# Patient Record
Sex: MALE | Race: WHITE | NOT HISPANIC OR LATINO | Employment: OTHER | RURAL
[De-identification: names, ages, dates, MRNs, and addresses within clinical notes are randomized per-mention and may not be internally consistent; named-entity substitution may affect disease eponyms.]

---

## 2022-02-20 ENCOUNTER — HOSPITAL ENCOUNTER (EMERGENCY)
Facility: HOSPITAL | Age: 72
Discharge: HOME | End: 2022-02-20
Payer: COMMERCIAL

## 2022-02-20 VITALS
HEART RATE: 79 BPM | DIASTOLIC BLOOD PRESSURE: 73 MMHG | BODY MASS INDEX: 48.64 KG/M2 | RESPIRATION RATE: 22 BRPM | WEIGHT: 302.63 LBS | SYSTOLIC BLOOD PRESSURE: 152 MMHG | OXYGEN SATURATION: 96 % | HEIGHT: 66 IN | TEMPERATURE: 98 F

## 2022-02-20 DIAGNOSIS — R11.2 NAUSEA VOMITING AND DIARRHEA: Primary | ICD-10-CM

## 2022-02-20 DIAGNOSIS — R19.7 NAUSEA VOMITING AND DIARRHEA: Primary | ICD-10-CM

## 2022-02-20 LAB
ACETONE SERPL QL SCN: NEGATIVE
ALBUMIN SERPL BCP-MCNC: 3.7 G/DL (ref 3.5–5)
ALBUMIN/GLOB SERPL: 1.1 {RATIO}
ALP SERPL-CCNC: 97 U/L (ref 45–115)
ALT SERPL W P-5'-P-CCNC: 26 U/L (ref 16–61)
ANION GAP SERPL CALCULATED.3IONS-SCNC: 16 MMOL/L (ref 7–16)
AST SERPL W P-5'-P-CCNC: 23 U/L (ref 15–37)
BACTERIA #/AREA URNS HPF: ABNORMAL /HPF
BASOPHILS # BLD AUTO: 0.02 K/UL (ref 0–0.2)
BASOPHILS NFR BLD AUTO: 0.2 % (ref 0–1)
BILIRUB SERPL-MCNC: 0.8 MG/DL (ref 0–1.2)
BILIRUB UR QL STRIP: NEGATIVE
BUN SERPL-MCNC: 29 MG/DL (ref 7–18)
BUN/CREAT SERPL: 18 (ref 6–20)
CALCIUM SERPL-MCNC: 8.9 MG/DL (ref 8.5–10.1)
CHLORIDE SERPL-SCNC: 99 MMOL/L (ref 98–107)
CLARITY UR: CLEAR
CO2 SERPL-SCNC: 26 MMOL/L (ref 21–32)
COLOR UR: YELLOW
CREAT SERPL-MCNC: 1.61 MG/DL (ref 0.7–1.3)
DIFFERENTIAL METHOD BLD: ABNORMAL
EOSINOPHIL # BLD AUTO: 0.11 K/UL (ref 0–0.5)
EOSINOPHIL NFR BLD AUTO: 0.8 % (ref 1–4)
EOSINOPHIL NFR BLD MANUAL: 1 % (ref 1–4)
ERYTHROCYTE [DISTWIDTH] IN BLOOD BY AUTOMATED COUNT: 13.5 % (ref 11.5–14.5)
FLUAV AG UPPER RESP QL IA.RAPID: NEGATIVE
FLUBV AG UPPER RESP QL IA.RAPID: NEGATIVE
GLOBULIN SER-MCNC: 3.3 G/DL (ref 2–4)
GLUCOSE SERPL-MCNC: 353 MG/DL (ref 70–105)
GLUCOSE SERPL-MCNC: 407 MG/DL (ref 74–106)
GLUCOSE UR STRIP-MCNC: >=1000 MG/DL
HCT VFR BLD AUTO: 44.6 % (ref 40–54)
HGB BLD-MCNC: 14.8 G/DL (ref 13.5–18)
KETONES UR STRIP-SCNC: 15 MG/DL
LEUKOCYTE ESTERASE UR QL STRIP: NEGATIVE
LIPASE SERPL-CCNC: 83 U/L (ref 73–393)
LYMPHOCYTES # BLD AUTO: 0.4 K/UL (ref 1–4.8)
LYMPHOCYTES NFR BLD AUTO: 3 % (ref 27–41)
LYMPHOCYTES NFR BLD MANUAL: 5 % (ref 27–41)
MCH RBC QN AUTO: 30.1 PG (ref 27–31)
MCHC RBC AUTO-ENTMCNC: 33.2 G/DL (ref 32–36)
MCV RBC AUTO: 90.7 FL (ref 80–96)
MONOCYTES # BLD AUTO: 1.15 K/UL (ref 0–0.8)
MONOCYTES NFR BLD AUTO: 8.8 % (ref 2–6)
MONOCYTES NFR BLD MANUAL: 7 % (ref 2–6)
MPC BLD CALC-MCNC: 9.4 FL (ref 9.4–12.4)
MUCOUS THREADS #/AREA URNS HPF: ABNORMAL /HPF
NEUTROPHILS # BLD AUTO: 11.46 K/UL (ref 1.8–7.7)
NEUTROPHILS NFR BLD AUTO: 87.2 % (ref 53–65)
NEUTS BAND NFR BLD MANUAL: 7 % (ref 1–5)
NEUTS SEG NFR BLD MANUAL: 80 % (ref 50–62)
NITRITE UR QL STRIP: NEGATIVE
NRBC BLD MANUAL-RTO: ABNORMAL %
PH UR STRIP: 5 PH UNITS
PLATELET # BLD AUTO: 135 K/UL (ref 150–400)
PLATELET MORPHOLOGY: ABNORMAL
POTASSIUM SERPL-SCNC: 4.4 MMOL/L (ref 3.5–5.1)
PROT SERPL-MCNC: 7 G/DL (ref 6.4–8.2)
PROT UR QL STRIP: 30
RBC # BLD AUTO: 4.92 M/UL (ref 4.6–6.2)
RBC # UR STRIP: NEGATIVE /UL
RBC #/AREA URNS HPF: ABNORMAL /HPF
RBC MORPH BLD: NORMAL
REACTIVE LYMPHOCYTES: ABNORMAL
SARS-COV+SARS-COV-2 AG RESP QL IA.RAPID: NEGATIVE
SODIUM SERPL-SCNC: 137 MMOL/L (ref 136–145)
SP GR UR STRIP: 1.01
SQUAMOUS #/AREA URNS LPF: ABNORMAL /LPF
UROBILINOGEN UR STRIP-ACNC: 0.2 MG/DL
WBC # BLD AUTO: 13.14 K/UL (ref 4.5–11)
WBC #/AREA URNS HPF: ABNORMAL /HPF

## 2022-02-20 PROCEDURE — 81001 URINALYSIS AUTO W/SCOPE: CPT | Performed by: NURSE PRACTITIONER

## 2022-02-20 PROCEDURE — 99284 PR EMERGENCY DEPT VISIT,LEVEL IV: ICD-10-PCS | Mod: ,,, | Performed by: NURSE PRACTITIONER

## 2022-02-20 PROCEDURE — 96361 HYDRATE IV INFUSION ADD-ON: CPT

## 2022-02-20 PROCEDURE — 87428 SARSCOV & INF VIR A&B AG IA: CPT | Performed by: NURSE PRACTITIONER

## 2022-02-20 PROCEDURE — 96374 THER/PROPH/DIAG INJ IV PUSH: CPT

## 2022-02-20 PROCEDURE — 99284 EMERGENCY DEPT VISIT MOD MDM: CPT | Mod: ,,, | Performed by: NURSE PRACTITIONER

## 2022-02-20 PROCEDURE — 82962 GLUCOSE BLOOD TEST: CPT

## 2022-02-20 PROCEDURE — 83690 ASSAY OF LIPASE: CPT | Performed by: NURSE PRACTITIONER

## 2022-02-20 PROCEDURE — 82009 KETONE BODYS QUAL: CPT | Performed by: NURSE PRACTITIONER

## 2022-02-20 PROCEDURE — 36415 COLL VENOUS BLD VENIPUNCTURE: CPT | Performed by: NURSE PRACTITIONER

## 2022-02-20 PROCEDURE — 99285 EMERGENCY DEPT VISIT HI MDM: CPT | Mod: 25,CS

## 2022-02-20 PROCEDURE — 85025 COMPLETE CBC W/AUTO DIFF WBC: CPT | Performed by: NURSE PRACTITIONER

## 2022-02-20 PROCEDURE — 25000003 PHARM REV CODE 250: Performed by: NURSE PRACTITIONER

## 2022-02-20 PROCEDURE — 63600175 PHARM REV CODE 636 W HCPCS: Performed by: NURSE PRACTITIONER

## 2022-02-20 PROCEDURE — 80053 COMPREHEN METABOLIC PANEL: CPT | Performed by: NURSE PRACTITIONER

## 2022-02-20 RX ORDER — ATORVASTATIN CALCIUM 20 MG/1
20 TABLET, FILM COATED ORAL
COMMUNITY

## 2022-02-20 RX ORDER — ONDANSETRON 4 MG/1
4 TABLET, ORALLY DISINTEGRATING ORAL EVERY 8 HOURS PRN
Qty: 10 TABLET | Refills: 0 | Status: SHIPPED | OUTPATIENT
Start: 2022-02-20

## 2022-02-20 RX ORDER — VALSARTAN 160 MG/1
TABLET ORAL
COMMUNITY
Start: 2022-01-05

## 2022-02-20 RX ORDER — AMMONIUM LACTATE 12 G/100G
LOTION TOPICAL
COMMUNITY

## 2022-02-20 RX ORDER — ONDANSETRON 4 MG/1
4 TABLET, ORALLY DISINTEGRATING ORAL EVERY 8 HOURS PRN
Qty: 10 TABLET | Refills: 0 | Status: CANCELLED | OUTPATIENT
Start: 2022-02-20

## 2022-02-20 RX ORDER — AMIODARONE HYDROCHLORIDE 200 MG/1
100 TABLET ORAL DAILY
COMMUNITY
Start: 2022-01-10

## 2022-02-20 RX ORDER — APIXABAN 5 MG/1
5 TABLET, FILM COATED ORAL 2 TIMES DAILY
COMMUNITY
Start: 2022-02-02

## 2022-02-20 RX ORDER — ONDANSETRON 2 MG/ML
4 INJECTION INTRAMUSCULAR; INTRAVENOUS
Status: COMPLETED | OUTPATIENT
Start: 2022-02-20 | End: 2022-02-20

## 2022-02-20 RX ORDER — DULAGLUTIDE 0.75 MG/.5ML
INJECTION, SOLUTION SUBCUTANEOUS
COMMUNITY
Start: 2022-01-04

## 2022-02-20 RX ORDER — CLOBETASOL PROPIONATE 0.5 MG/G
CREAM TOPICAL
COMMUNITY
Start: 2022-01-03

## 2022-02-20 RX ADMIN — ONDANSETRON 4 MG: 2 INJECTION INTRAMUSCULAR; INTRAVENOUS at 06:02

## 2022-02-20 RX ADMIN — SODIUM CHLORIDE 1000 ML: 9 INJECTION, SOLUTION INTRAVENOUS at 06:02

## 2022-02-20 NOTE — DISCHARGE INSTRUCTIONS
Take ondansetron as needed for nausea. Drink plenty of liquids. Maintain a clear liquid diet for the next 8-12 hours. Increased to bland diet if not vomiting. Stay on bland diet for 2-3 days. Monitor you blood sugar closely since it is elevated today. Return to the ED if you start to have fever, have worsening or change in pain, intractable vomiting, or any other worsening. Follow-up with your PCP in 2-3 days for recheck.

## 2022-02-20 NOTE — ED PROVIDER NOTES
Encounter Date: 2022       History     Chief Complaint   Patient presents with    Abdominal Pain     Right side     Presents with nausea, vomiting, diarrhea, and right lower abdominal pain.  Began with right lower abdominal pain upon awakening yesterday, then progressing with nausea, vomiting, and diarrhea.  Afebrile.  Denies dysuria, hematuria, or change in urinary frequency.  Has not tolerated p.o. intake since yesterday.  Last normal bowel movement was yesterday.  Denies blood or mucus in the stool.  No history of abdominal surgery.        Review of patient's allergies indicates:   Allergen Reactions    Bactrim [sulfamethoxazole-trimethoprim] Nausea And Vomiting    Sulfa (sulfonamide antibiotics) Nausea And Vomiting    Adhesive Rash    Latex, natural rubber Rash     Past Medical History:   Diagnosis Date    Cataract     Cyst, kidney, acquired     Diabetes mellitus     Hypertension     Kidney stones      Past Surgical History:   Procedure Laterality Date    CARPAL TUNNEL RELEASE Left     ELBOW FUSION Right     KIDNEY STONE SURGERY      SHOULDER SURGERY Right     spur scraped    TOTAL KNEE ARTHROPLASTY Bilateral      Family History   Problem Relation Age of Onset    Diabetes Mother     Hypertension Mother     Cancer Father      Social History     Tobacco Use    Smoking status: Former Smoker     Packs/day: 2.00     Years: 27.00     Pack years: 54.00     Types: Cigarettes     Start date: 1962     Quit date: 12/3/1989     Years since quittin.2    Smokeless tobacco: Never Used    Tobacco comment: quit 15-20 years   Substance Use Topics    Alcohol use: No    Drug use: No     Review of Systems   Constitutional: Negative for fever.   HENT: Negative for trouble swallowing.    Eyes: Negative for visual disturbance.   Respiratory: Negative for shortness of breath.    Cardiovascular: Negative for chest pain, palpitations and leg swelling.   Gastrointestinal: Positive for abdominal pain,  diarrhea, nausea and vomiting.   Genitourinary: Negative for decreased urine volume, dysuria, frequency and hematuria.   Skin: Negative for color change.   Neurological: Negative for dizziness, numbness and headaches.       Physical Exam     Initial Vitals [02/20/22 0531]   BP Pulse Resp Temp SpO2   (!) 146/76 87 18 98.9 °F (37.2 °C) 96 %      MAP       --         Physical Exam    Nursing note and vitals reviewed.  Constitutional: No distress.   HENT:   Head: Normocephalic and atraumatic.   Eyes: EOM are normal. Pupils are equal, round, and reactive to light.   Neck: Neck supple.   Cardiovascular: Normal rate, regular rhythm and normal heart sounds.   Pulmonary/Chest: Breath sounds normal. No respiratory distress.   Abdominal: Abdomen is soft. Bowel sounds are normal. He exhibits no distension. There is no abdominal tenderness (CVAT absent).   Musculoskeletal:         General: No edema.      Cervical back: Neck supple.     Neurological: He is alert. GCS score is 15. GCS eye subscore is 4. GCS verbal subscore is 5. GCS motor subscore is 6.   Skin: Skin is warm and dry. Capillary refill takes less than 2 seconds.         Medical Screening Exam   See Full Note    ED Course   Procedures  Labs Reviewed   COMPREHENSIVE METABOLIC PANEL - Abnormal; Notable for the following components:       Result Value    Glucose 407 (*)     BUN 29 (*)     Creatinine 1.61 (*)     eGFR 45 (*)     All other components within normal limits   URINALYSIS, REFLEX TO URINE CULTURE - Abnormal; Notable for the following components:    Protein, UA 30  (*)     Glucose, UA >=1000 (*)     Ketones, UA 15  (*)     All other components within normal limits   CBC WITH DIFFERENTIAL - Abnormal; Notable for the following components:    WBC 13.14 (*)     Platelet Count 135 (*)     Neutrophils % 87.2 (*)     Lymphocytes % 3.0 (*)     Neutrophils, Abs 11.46 (*)     Lymphocytes, Absolute 0.40 (*)     Monocytes % 8.8 (*)     Eosinophils % 0.8 (*)     Monocytes,  Absolute 1.15 (*)     All other components within normal limits   MANUAL DIFFERENTIAL - Abnormal; Notable for the following components:    Segmented Neutrophils, Man % 80 (*)     Bands, Man % 7 (*)     Lymphocytes, Man % 5 (*)     Monocytes, Man % 7 (*)     Platelet Morphology Decreased (*)     All other components within normal limits   URINALYSIS, MICROSCOPIC - Abnormal; Notable for the following components:    Bacteria, UA Occasional (*)     Squamous Epithelial Cells, UA Few (*)     Mucus, UA Few (*)     All other components within normal limits   LIPASE - Normal   SARS-COV2 (COVID) W/ FLU ANTIGEN - Normal    Narrative:     Negative SARS-CoV results should not be used as the sole basis for treatment or patient management decisions; negative results should be considered in the context of a patient's recent exposures, history and the presene of clinical signs and symptoms consistent with COVID-19.  Negative results should be treated as presumptive and confirmed by molecular assay, if necessary for patient management.   CBC W/ AUTO DIFFERENTIAL    Narrative:     The following orders were created for panel order CBC auto differential.  Procedure                               Abnormality         Status                     ---------                               -----------         ------                     CBC with Differential[[id number]]        Abnormal            Final result               Manual Differential[[id number]]          Abnormal            Final result                 Please view results for these tests on the individual orders.   ACETONE          Imaging Results          CT Abdomen Pelvis  Without Contrast (Final result)  Result time 02/20/22 08:11:20    Final result by Sheryl Zuniga MD (02/20/22 08:11:20)                 Impression:      1. Cholelithiasis  2. Cardiomegaly with minimal pericardial thickening or fluid  3. Bilateral renal cysts not well evaluated without contrast  4. Grade 1-2  spondylolisthesis at L5-S1  This CT exam was performed using one or more of the following dose reduction techniques: Automated exposure control, adjustment of the mA and/or kV according to patient's size, or use of iterative reconstruction technique.      Electronically signed by: Sheryl Zuniga  Date:    02/20/2022  Time:    08:11             Narrative:    EXAMINATION:  CT ABDOMEN PELVIS WITHOUT CONTRAST    CLINICAL HISTORY:  Abdominal abscess/infection suspected;    FINDINGS:  Comparison 01/26/2016    Heart is enlarged.  Minimal pericardial thickening or fluid present.    Multiple gallstones present    No secondary signs of acute ureteral obstruction seen.  Bilateral renal cysts again demonstrated not well evaluated without contrast.  Some mildly hyperdense cysts again seen with a larger at least 2.3 cm cyst in the lower pole the right kidney again demonstrated.    No enlarged retroperitoneal nodes seen    Bowel is unopacified limiting visualization    Appendix not definitively seen.  Nonspecific mesenteric nodes less than 1 cm.    Pelvis:    No significant free fluid or focal inflammatory change is seen.    There is grade 1-2 spondylolisthesis at L5-S1.                                 Medications   sodium chloride 0.9% bolus 1,000 mL (0 mLs Intravenous Stopped 2/20/22 0719)   ondansetron injection 4 mg (4 mg Intravenous Given 2/20/22 0619)     Medical Decision Making:   ED Management:  Report from ANKITA Johnston. Assessed pt. States overall is feeling better but c/o RLQ pain that worsens with movement and amb. Mild tenderness RLQ. No rebound or guarding noted. CT abd/pelvis ordered without contrast due to creatinine 1.61 and GFR 45. CT shows cholelithiasis, appendix not seen, but no signs of infectious/inflammatory process per radiology. Discussed findings with pt and wife. Instructed that even though no infectious/inflammatory process is noted on CT. It may be early in the disease process. Instructed to return to the  ED for any signs of worsening..fever, worse or change in pain, intractable vomiting or other concerning symptoms. Verbalized understanding.                   Clinical Impression:   Final diagnoses:  [R11.2, R19.7] Nausea vomiting and diarrhea (Primary)          ED Disposition Condition    Discharge Stable        ED Prescriptions     Medication Sig Dispense Start Date End Date Auth. Provider    ondansetron (ZOFRAN-ODT) 4 MG TbDL Take 1 tablet (4 mg total) by mouth every 8 (eight) hours as needed (nausea/vomiting). 10 tablet 2/20/2022  Nina Samayoa NP        Follow-up Information     Follow up With Specialties Details Why Contact Info    [facility] - Emergency Department Emergency Medicine  If symptoms worsen [address] [place] [id number] [phone]    Jose Donald MD Internal Medicine In 2 days  [address] [facility] [address]  [phone]             Nina Samayoa NP  02/20/22 0834       Nina Samayoa NP  02/20/22 0843

## 2022-02-20 NOTE — ED NOTES
Assumed care of patient, staff introduced to patient. Wife at side updated on plan of care. patient  denied any needs or pain at this time. resp even alert and oriented.

## 2022-02-20 NOTE — ED NOTES
Reviewed diet plan for clear liquids than advance to brat diet. Continue current diabetic medications. Good verbal recall of instructions copy of discharge teaching given to patient. .

## 2022-02-28 ENCOUNTER — HOSPITAL ENCOUNTER (EMERGENCY)
Facility: HOSPITAL | Age: 72
Discharge: HOME | End: 2022-02-28
Attending: EMERGENCY MEDICINE
Payer: MEDICARE

## 2022-02-28 VITALS
TEMPERATURE: 99 F | SYSTOLIC BLOOD PRESSURE: 180 MMHG | BODY MASS INDEX: 50.14 KG/M2 | HEART RATE: 67 BPM | OXYGEN SATURATION: 95 % | WEIGHT: 312 LBS | RESPIRATION RATE: 13 BRPM | DIASTOLIC BLOOD PRESSURE: 87 MMHG | HEIGHT: 66 IN

## 2022-02-28 DIAGNOSIS — R10.31 RIGHT LOWER QUADRANT ABDOMINAL PAIN: Primary | ICD-10-CM

## 2022-02-28 LAB
ALBUMIN SERPL BCP-MCNC: 3.6 G/DL (ref 3.5–5.2)
ALP SERPL-CCNC: 90 U/L (ref 55–135)
ALT SERPL W/O P-5'-P-CCNC: 21 U/L (ref 10–44)
ANION GAP SERPL CALC-SCNC: 10 MMOL/L (ref 8–16)
ANION GAP SERPL CALC-SCNC: 17 MMOL/L (ref 8–16)
AST SERPL-CCNC: 12 U/L (ref 10–40)
BACTERIA #/AREA URNS HPF: ABNORMAL /HPF
BASOPHILS # BLD AUTO: 0.03 K/UL (ref 0–0.2)
BASOPHILS NFR BLD: 0.3 % (ref 0–1.9)
BILIRUB SERPL-MCNC: 0.7 MG/DL (ref 0.1–1)
BILIRUB UR QL STRIP: NEGATIVE
BUN SERPL-MCNC: 26 MG/DL (ref 6–30)
BUN SERPL-MCNC: 26 MG/DL (ref 8–23)
CALCIUM SERPL-MCNC: 9.3 MG/DL (ref 8.7–10.5)
CHLORIDE SERPL-SCNC: 101 MMOL/L (ref 95–110)
CHLORIDE SERPL-SCNC: 103 MMOL/L (ref 95–110)
CLARITY UR: CLEAR
CO2 SERPL-SCNC: 26 MMOL/L (ref 23–29)
COLOR UR: YELLOW
CREAT SERPL-MCNC: 1.2 MG/DL (ref 0.5–1.4)
CREAT SERPL-MCNC: 1.3 MG/DL (ref 0.5–1.4)
CTP QC/QA: YES
CTP QC/QA: YES
DIFFERENTIAL METHOD: ABNORMAL
EOSINOPHIL # BLD AUTO: 0.3 K/UL (ref 0–0.5)
EOSINOPHIL NFR BLD: 3.3 % (ref 0–8)
ERYTHROCYTE [DISTWIDTH] IN BLOOD BY AUTOMATED COUNT: 13.4 % (ref 11.5–14.5)
EST. GFR  (AFRICAN AMERICAN): >60 ML/MIN/1.73 M^2
EST. GFR  (NON AFRICAN AMERICAN): 55 ML/MIN/1.73 M^2
GLUCOSE SERPL-MCNC: 217 MG/DL (ref 70–110)
GLUCOSE SERPL-MCNC: 225 MG/DL (ref 70–110)
GLUCOSE UR QL STRIP: ABNORMAL
HCT VFR BLD AUTO: 42.7 % (ref 40–54)
HCT VFR BLD CALC: 41 %PCV (ref 36–54)
HGB BLD-MCNC: 13.9 G/DL (ref 14–18)
HGB UR QL STRIP: ABNORMAL
HYALINE CASTS #/AREA URNS LPF: 1 /LPF
IMM GRANULOCYTES # BLD AUTO: 0.09 K/UL (ref 0–0.04)
IMM GRANULOCYTES NFR BLD AUTO: 1 % (ref 0–0.5)
KETONES UR QL STRIP: NEGATIVE
LACTATE SERPL-SCNC: 2.9 MMOL/L (ref 0.5–2.2)
LEUKOCYTE ESTERASE UR QL STRIP: NEGATIVE
LIPASE SERPL-CCNC: 61 U/L (ref 4–60)
LYMPHOCYTES # BLD AUTO: 1.2 K/UL (ref 1–4.8)
LYMPHOCYTES NFR BLD: 13.1 % (ref 18–48)
MCH RBC QN AUTO: 30.8 PG (ref 27–31)
MCHC RBC AUTO-ENTMCNC: 32.6 G/DL (ref 32–36)
MCV RBC AUTO: 95 FL (ref 82–98)
MICROSCOPIC COMMENT: ABNORMAL
MONOCYTES # BLD AUTO: 0.6 K/UL (ref 0.3–1)
MONOCYTES NFR BLD: 7 % (ref 4–15)
NEUTROPHILS # BLD AUTO: 6.7 K/UL (ref 1.8–7.7)
NEUTROPHILS NFR BLD: 75.3 % (ref 38–73)
NITRITE UR QL STRIP: NEGATIVE
NRBC BLD-RTO: 0 /100 WBC
PH UR STRIP: 6 [PH] (ref 5–8)
PLATELET # BLD AUTO: 164 K/UL (ref 150–450)
PMV BLD AUTO: 9.1 FL (ref 9.2–12.9)
POC IONIZED CALCIUM: 1.24 MMOL/L (ref 1.06–1.42)
POC MOLECULAR INFLUENZA A AGN: NEGATIVE
POC MOLECULAR INFLUENZA B AGN: NEGATIVE
POC TCO2 (MEASURED): 25 MMOL/L (ref 23–29)
POTASSIUM BLD-SCNC: 4.2 MMOL/L (ref 3.5–5.1)
POTASSIUM SERPL-SCNC: 4.3 MMOL/L (ref 3.5–5.1)
PROT SERPL-MCNC: 6.7 G/DL (ref 6–8.4)
PROT UR QL STRIP: ABNORMAL
RBC # BLD AUTO: 4.51 M/UL (ref 4.6–6.2)
RBC #/AREA URNS HPF: 5 /HPF (ref 0–4)
SAMPLE: ABNORMAL
SARS-COV-2 RDRP RESP QL NAA+PROBE: NEGATIVE
SODIUM BLD-SCNC: 139 MMOL/L (ref 136–145)
SODIUM SERPL-SCNC: 139 MMOL/L (ref 136–145)
SP GR UR STRIP: 1.02 (ref 1–1.03)
URN SPEC COLLECT METH UR: ABNORMAL
UROBILINOGEN UR STRIP-ACNC: NEGATIVE EU/DL
WBC # BLD AUTO: 8.85 K/UL (ref 3.9–12.7)
WBC #/AREA URNS HPF: 1 /HPF (ref 0–5)
YEAST URNS QL MICRO: ABNORMAL

## 2022-02-28 PROCEDURE — 81000 URINALYSIS NONAUTO W/SCOPE: CPT | Performed by: EMERGENCY MEDICINE

## 2022-02-28 PROCEDURE — 82330 ASSAY OF CALCIUM: CPT

## 2022-02-28 PROCEDURE — 96374 THER/PROPH/DIAG INJ IV PUSH: CPT

## 2022-02-28 PROCEDURE — 99285 EMERGENCY DEPT VISIT HI MDM: CPT | Mod: 25

## 2022-02-28 PROCEDURE — 25500020 PHARM REV CODE 255: Performed by: EMERGENCY MEDICINE

## 2022-02-28 PROCEDURE — U0002 COVID-19 LAB TEST NON-CDC: HCPCS | Performed by: EMERGENCY MEDICINE

## 2022-02-28 PROCEDURE — 84295 ASSAY OF SERUM SODIUM: CPT | Mod: 91

## 2022-02-28 PROCEDURE — 82565 ASSAY OF CREATININE: CPT | Mod: 91

## 2022-02-28 PROCEDURE — 83690 ASSAY OF LIPASE: CPT | Performed by: EMERGENCY MEDICINE

## 2022-02-28 PROCEDURE — 83605 ASSAY OF LACTIC ACID: CPT | Performed by: EMERGENCY MEDICINE

## 2022-02-28 PROCEDURE — 84132 ASSAY OF SERUM POTASSIUM: CPT | Mod: 91

## 2022-02-28 PROCEDURE — 80053 COMPREHEN METABOLIC PANEL: CPT | Performed by: EMERGENCY MEDICINE

## 2022-02-28 PROCEDURE — 87502 INFLUENZA DNA AMP PROBE: CPT

## 2022-02-28 PROCEDURE — 99900035 HC TECH TIME PER 15 MIN (STAT)

## 2022-02-28 PROCEDURE — 85025 COMPLETE CBC W/AUTO DIFF WBC: CPT | Performed by: EMERGENCY MEDICINE

## 2022-02-28 PROCEDURE — 63600175 PHARM REV CODE 636 W HCPCS: Performed by: EMERGENCY MEDICINE

## 2022-02-28 PROCEDURE — 85014 HEMATOCRIT: CPT

## 2022-02-28 RX ORDER — METOPROLOL TARTRATE 50 MG/1
1 TABLET ORAL 2 TIMES DAILY
COMMUNITY
Start: 2021-04-13

## 2022-02-28 RX ORDER — ONDANSETRON 4 MG/1
4 TABLET, FILM COATED ORAL EVERY 6 HOURS PRN
Qty: 12 TABLET | Refills: 0 | Status: SHIPPED | OUTPATIENT
Start: 2022-02-28

## 2022-02-28 RX ORDER — DICYCLOMINE HYDROCHLORIDE 20 MG/1
20 TABLET ORAL 2 TIMES DAILY PRN
Qty: 20 TABLET | Refills: 0 | Status: SHIPPED | OUTPATIENT
Start: 2022-02-28 | End: 2022-03-30

## 2022-02-28 RX ORDER — KETOROLAC TROMETHAMINE 30 MG/ML
15 INJECTION, SOLUTION INTRAMUSCULAR; INTRAVENOUS
Status: COMPLETED | OUTPATIENT
Start: 2022-02-28 | End: 2022-02-28

## 2022-02-28 RX ADMIN — KETOROLAC TROMETHAMINE 15 MG: 30 INJECTION, SOLUTION INTRAMUSCULAR at 05:02

## 2022-02-28 RX ADMIN — IOHEXOL 100 ML: 350 INJECTION, SOLUTION INTRAVENOUS at 04:02

## 2022-02-28 RX ADMIN — SODIUM CHLORIDE, SODIUM LACTATE, POTASSIUM CHLORIDE, AND CALCIUM CHLORIDE 2000 ML: .6; .31; .03; .02 INJECTION, SOLUTION INTRAVENOUS at 04:02

## 2022-02-28 NOTE — DISCHARGE INSTRUCTIONS
Thank you for coming to our Emergency Department today. It is important to remember that some problems are difficult to diagnose and may not be found during your first visit. Be sure to follow up with your primary care doctor and review any labs/imaging that was performed with them. If you do not have a primary care doctor, you may contact the one listed on your discharge paperwork or you may also call the [facility] Appointment Desk at [phone] to schedule an appointment with one.     All medications may potentially have side effects and it is impossible to predict which medications may give you side effects. If you feel that you are having a negative effect of any medication you should immediately stop taking them and seek medical attention.    Return to the ER with any questions/concerns, new/concerning symptoms, worsening or failure to improve. Do not drive or make any important decisions for 24 hours if you have received any pain medications, sedatives or mood altering drugs during your ER visit.

## 2022-02-28 NOTE — ED PROVIDER NOTES
Encounter Date: 2/28/2022    SCRIBE #1 NOTE: I, Neyda Kaba, am scribing for, and in the presence of,  Gokul Thao MD. I have scribed the following portions of the note - Other sections scribed: HPI, ROS, PE .       History     Chief Complaint   Patient presents with    Abdominal Pain     The patient reports a pain to right lower abdominal  x 1 week. Patient reports also reports nausea, vomiting, diarrhea in the beginning of this illness. Denies dysuria, hematuria, fevers, chills.      CC: Abdominal pain     HPI: This is a 71 y.o.male patient, with a PMHx of DM, HTN, and Kidney stones, presenting to the ED for further evaluation of abdominal pain beginning a week ago. Patient was seen at [facility] in [place] regarding the symptoms and the pain has been steady since then. Patient reports associated symptoms of nausea, vomiting, and diarrhea. Patient denies any fever, chills, shortness of breath, chest pain, neck pain, back pain, rash, headaches, congestion, rhinorrhea, cough, sore throat, ear pain, eye pain, blurred vision, dysuria, urinary frequency, testicular pain or any other associated symptoms. Patient reports the use of blood thinners: Eliquis. No alleviating or aggravating factors. Patient is allergic to Sulfa.       The history is provided by the patient. No  was used.     Review of patient's allergies indicates:   Allergen Reactions    Bactrim [sulfamethoxazole-trimethoprim] Nausea And Vomiting    Sulfa (sulfonamide antibiotics) Nausea And Vomiting    Adhesive Rash    Latex, natural rubber Rash     Past Medical History:   Diagnosis Date    Cataract     Cyst, kidney, acquired     Diabetes mellitus     Hypertension     Kidney stones      Past Surgical History:   Procedure Laterality Date    CARPAL TUNNEL RELEASE Left     ELBOW FUSION Right     KIDNEY STONE SURGERY      SHOULDER SURGERY Right     spur scraped    TOTAL KNEE ARTHROPLASTY Bilateral      Family History    Problem Relation Age of Onset    Diabetes Mother     Hypertension Mother     Cancer Father      Social History     Tobacco Use    Smoking status: Former Smoker     Packs/day: 2.00     Years: 27.00     Pack years: 54.00     Types: Cigarettes     Start date: 1962     Quit date: 12/3/1989     Years since quittin.2    Smokeless tobacco: Never Used    Tobacco comment: quit 15-20 years   Substance Use Topics    Alcohol use: No    Drug use: No     Review of Systems   Constitutional: Negative for chills and fever.   HENT: Negative for congestion, ear discharge, ear pain, rhinorrhea, sore throat and trouble swallowing.    Eyes: Negative for visual disturbance.   Respiratory: Negative for cough and shortness of breath.    Cardiovascular: Negative for chest pain and leg swelling.   Gastrointestinal: Positive for abdominal pain, diarrhea, nausea and vomiting.   Genitourinary: Negative for dysuria, frequency and testicular pain.   Musculoskeletal: Negative for back pain, neck pain and neck stiffness.   Skin: Negative for color change, rash and wound.   Neurological: Negative for seizures, syncope, speech difficulty, weakness and headaches.   Psychiatric/Behavioral: Negative for confusion.       Physical Exam     Initial Vitals [22 1423]   BP Pulse Resp Temp SpO2   (!) 160/87 71 18 98.7 °F (37.1 °C) 97 %      MAP       --         Physical Exam    Nursing note and vitals reviewed.  Constitutional: He appears well-developed and well-nourished.   Overweight   HENT:   Head: Normocephalic and atraumatic.   Mouth/Throat: Mucous membranes are normal.   Patent   Eyes: Conjunctivae and EOM are normal. Pupils are equal, round, and reactive to light. Right conjunctiva is not injected. Left conjunctiva is not injected.   sclera anicteric   Neck: Neck supple.    Full passive range of motion without pain.     Cardiovascular: Regular rhythm, S1 normal, S2 normal and normal heart sounds. Exam reveals no gallop and no  friction rub.    No murmur heard.  Pulses:       Radial pulses are 2+ on the right side and 2+ on the left side.   Pulmonary/Chest: Effort normal and breath sounds normal. No respiratory distress.   Abdominal: Abdomen is soft. He exhibits no distension. There is abdominal tenderness in the right lower quadrant. There is no rebound and no guarding.   Musculoskeletal:      Cervical back: Full passive range of motion without pain and neck supple.      Comments: Good active ROM of all extremities. No lower extremity edema or cyanosis.     Neurological: He is alert. No cranial nerve deficit or sensory deficit. Gait normal.   A&Ox4, normal speech   Skin: Skin is warm. No ecchymosis and no rash noted.   Psychiatric: He has a normal mood and affect. Thought content normal.         ED Course   Procedures  Labs Reviewed   CBC W/ AUTO DIFFERENTIAL - Abnormal; Notable for the following components:       Result Value    RBC 4.51 (*)     Hemoglobin 13.9 (*)     MPV 9.1 (*)     Immature Granulocytes 1.0 (*)     Immature Grans (Abs) 0.09 (*)     Gran % 75.3 (*)     Lymph % 13.1 (*)     All other components within normal limits   COMPREHENSIVE METABOLIC PANEL - Abnormal; Notable for the following components:    Glucose 217 (*)     BUN 26 (*)     eGFR if non  55 (*)     All other components within normal limits   LIPASE - Abnormal; Notable for the following components:    Lipase 61 (*)     All other components within normal limits   URINALYSIS, REFLEX TO URINE CULTURE - Abnormal; Notable for the following components:    Protein, UA 1+ (*)     Glucose, UA 4+ (*)     Occult Blood UA Trace (*)     All other components within normal limits    Narrative:     Specimen Source->Urine   LACTIC ACID, PLASMA - Abnormal; Notable for the following components:    Lactate (Lactic Acid) 2.9 (*)     All other components within normal limits   URINALYSIS MICROSCOPIC - Abnormal; Notable for the following components:    RBC, UA 5 (*)      All other components within normal limits    Narrative:     Specimen Source->Urine   ISTAT PROCEDURE - Abnormal; Notable for the following components:    POC Glucose 225 (*)     POC Anion Gap 17 (*)     All other components within normal limits   POCT INFLUENZA A/B MOLECULAR   SARS-COV-2 RDRP GENE   ISTAT CHEM8          Imaging Results          CT Abdomen Pelvis With Contrast (Final result)  Result time 02/28/22 17:04:57    Final result by Fortino Reese MD (02/28/22 17:04:57)                 Impression:      1. No acute abnormality.  2. Cholelithiasis.  3. Bilateral renal cysts.  4. Chronic and degenerative changes of the lumbar spine primarily at L5-S1 with bilateral spondylolysis of L5 and grade 1 spondylolisthesis of L5 on S1.  5. Prostatomegaly.      Electronically signed by: Fortino Reese  Date:    02/28/2022  Time:    17:04             Narrative:    EXAMINATION:  CT ABDOMEN PELVIS WITH CONTRAST    CLINICAL HISTORY:  RLQ abdominal pain (Age >= 14y);    TECHNIQUE:  Low dose axial images, sagittal and coronal reformations were obtained from the lung bases to the pubic symphysis following the IV administration of 100 mL of Omnipaque 350 .  Oral contrast was not administered.    COMPARISON:  02/20/2022, 06/13/2013    FINDINGS:  Abdomen:    - Lower thorax:    - Lung bases: No infiltrates and no nodules.    - Liver: Possible tiny subcentimeter cyst in the right lobe posteriorly.  Limited characterization.    - Gallbladder: Few calcified gallstones.  No evidence of acute cholecystitis.    - Bile Ducts: No evidence of intra or extra hepatic biliary ductal dilation.    - Spleen: Negative.    - Kidneys: There are small bilateral fluid density masses the largest is right parapelvic region measuring 4.3 cm.  Findings suggest multiple renal cysts.  Limited characterization of the smallest subcentimeter lesions.  No stone, hydronephrosis or hydroureter bilaterally.    - Adrenals: Unremarkable.    - Pancreas: No mass or  peripancreatic fat stranding.    - Retroperitoneum:  No significant adenopathy.    - Vascular: No abdominal aortic aneurysm.    - Abdominal wall:  Unremarkable.    Pelvis:    Urinary bladder is within normal limits.    Prostate is enlarged measuring 5.8 cm.    Bowel/Mesentery:    No evidence of bowel obstruction or inflammation.    Bones:  No acute osseous abnormality and no suspicious lytic or blastic lesion.    Grade 1 spondylolisthesis of L5 on S1.  Bilateral spondylolysis of L5.  Bilateral foraminal narrowing.  Severe disc space narrowing and vacuum disc phenomena at L5-S1 and L1-2.                                 Medications   iohexoL (OMNIPAQUE 350) injection 100 mL (100 mLs Intravenous Given 2/28/22 1610)   lactated ringers bolus 2,000 mL (2,000 mLs Intravenous New Bag 2/28/22 1634)   ketorolac injection 15 mg (15 mg Intravenous Given 2/28/22 1724)     Medical Decision Making:   Initial Assessment:   71-year-old male presenting secondary to right lower abdominal pain.  Tenderness in that area.  Toradol with improvement symptoms.  Afebrile.  Labs reassuring.  Concerning for appendicitis.  Getting CT and basic labs.    Also considered but less likely:     AAA: location inconsistent, no pulsatile mass  Cholecystitis: location inconsistent, no relation with meals, negative petits  SBO: normal BM and flatus. No vomiting  Mesenteric ischemia: HPI inconsistent, does not coincide with meals, other dx more likely  Kidney stone: no radiation to back or cva tenderness, no dysuria, no hematuria  Pyelonephritis: no cva tenderness, no dysuria, no fever  Pancreatitis: no history of alcohol abuse, unlikely gallstone obstructing, location inconsistent  Diverticulitis: age and location not most common, no history of diverticulitis, no fever, no wbc  Epididymitis: no testicular swelling or redness  UTI: UA negative, no dysuria or increased frequency of urination  Testicular torsion: no testicular pain/swelling    CT and labs  reassuring.  Repeat exam reassuring.  Discharged with outpatient follow-up medications below. I discussed with the patient/family the diagnosis, treatment plan, indications for return to the emergency department, and for expected follow-up. The patient/family verbalized an understanding. The patient/family is asked if there are any questions or concerns. We discuss the case, until all issues are addressed to the patient/familys satisfaction. Patient/family understands and is agreeable to the plan.   Gokul Thao      Clinical Tests:   Lab Tests: Reviewed and Ordered  Radiological Study: Ordered and Reviewed          Scribe Attestation:   Scribe #1: I performed the above scribed service and the documentation accurately describes the services I performed. I attest to the accuracy of the note.                 Clinical Impression:   Final diagnoses:  [R10.31] Right lower quadrant abdominal pain (Primary)          ED Disposition Condition    Discharge Stable        ED Prescriptions     Medication Sig Dispense Start Date End Date Auth. Provider    dicyclomine (BENTYL) 20 mg tablet Take 1 tablet (20 mg total) by mouth 2 (two) times daily as needed (abdominal cramping). 20 tablet 2/28/2022 3/30/2022 Gokul Thao MD    ondansetron (ZOFRAN) 4 MG tablet Take 1 tablet (4 mg total) by mouth every 6 (six) hours as needed for Nausea. 12 tablet 2/28/2022  Gokul Thao MD        Follow-up Information     Follow up With Specialties Details Why Contact Info    Jose Donald MD Internal Medicine Schedule an appointment as soon as possible for a visit in 2 days  [address] [facility] [address]  [phone]           I, gokul thao, personally performed the services described in this documentation. All medical record entries made by the scribe were at my direction and in my presence. I have reviewed the chart and agree that the record reflects my personal performance and is accurate and complete.       Gokul Thao  MD  02/28/22 1919

## 2022-02-28 NOTE — ED TRIAGE NOTES
Pt c/o rt sided abd pain, nausea or vomiting x1 week.  Went to ER in [place]. Pt did have steady pain since then.  Pt reports black stool this am.

## 2022-08-15 ENCOUNTER — HOSPITAL ENCOUNTER (EMERGENCY)
Facility: HOSPITAL | Age: 72
Discharge: HOME HEALTH | End: 2022-08-15
Payer: COMMERCIAL

## 2022-08-15 VITALS
SYSTOLIC BLOOD PRESSURE: 169 MMHG | RESPIRATION RATE: 18 BRPM | HEIGHT: 66 IN | BODY MASS INDEX: 49.82 KG/M2 | DIASTOLIC BLOOD PRESSURE: 97 MMHG | WEIGHT: 310 LBS | TEMPERATURE: 98 F | OXYGEN SATURATION: 96 % | HEART RATE: 74 BPM

## 2022-08-15 DIAGNOSIS — L03.115 CELLULITIS OF RIGHT LOWER EXTREMITY: ICD-10-CM

## 2022-08-15 DIAGNOSIS — S80.02XA CONTUSION OF LEFT KNEE, INITIAL ENCOUNTER: Primary | ICD-10-CM

## 2022-08-15 PROCEDURE — 99283 PR EMERGENCY DEPT VISIT,LEVEL III: ICD-10-PCS | Mod: ,,, | Performed by: NURSE PRACTITIONER

## 2022-08-15 PROCEDURE — 99283 EMERGENCY DEPT VISIT LOW MDM: CPT | Mod: ,,, | Performed by: NURSE PRACTITIONER

## 2022-08-15 PROCEDURE — 99284 EMERGENCY DEPT VISIT MOD MDM: CPT

## 2022-08-15 PROCEDURE — 63600175 PHARM REV CODE 636 W HCPCS: Performed by: NURSE PRACTITIONER

## 2022-08-15 PROCEDURE — 96372 THER/PROPH/DIAG INJ SC/IM: CPT

## 2022-08-15 RX ORDER — KETOROLAC TROMETHAMINE 30 MG/ML
30 INJECTION, SOLUTION INTRAMUSCULAR; INTRAVENOUS
Status: COMPLETED | OUTPATIENT
Start: 2022-08-15 | End: 2022-08-15

## 2022-08-15 RX ADMIN — KETOROLAC TROMETHAMINE 30 MG: 30 INJECTION, SOLUTION INTRAMUSCULAR at 07:08

## 2022-08-15 NOTE — ED PROVIDER NOTES
Encounter Date: 8/15/2022       History     Chief Complaint   Patient presents with    Fall     C/o left knee and ankle pain from a trip and fall approx 8 days pta     Patient presents to the ED with complaints of bilateral knee pain. Patient reports he slipped and fell on his boat 9 days ago and was seen by a provider in [facility] but they did not do any x-rays. Patient reports he has been able to ambulate without difficulty. Patient reports he was started on Doxycycline 6 days ago due to an abrasion on the right shin and patient reports the wound appears to be improving. Patient reports increased swelling and pain to the left knee.         Review of patient's allergies indicates:   Allergen Reactions    Bactrim [sulfamethoxazole-trimethoprim] Nausea And Vomiting    Sulfa (sulfonamide antibiotics) Nausea And Vomiting    Adhesive Rash    Latex, natural rubber Rash     Past Medical History:   Diagnosis Date    Cataract     Cyst, kidney, acquired     Diabetes mellitus     Hypertension     Kidney stones      Past Surgical History:   Procedure Laterality Date    CARPAL TUNNEL RELEASE Left     ELBOW FUSION Right     KIDNEY STONE SURGERY      SHOULDER SURGERY Right     spur scraped    TOTAL KNEE ARTHROPLASTY Bilateral      Family History   Problem Relation Age of Onset    Diabetes Mother     Hypertension Mother     Cancer Father      Social History     Tobacco Use    Smoking status: Former Smoker     Packs/day: 2.00     Years: 27.00     Pack years: 54.00     Types: Cigarettes     Start date: 1962     Quit date: 12/3/1989     Years since quittin.7    Smokeless tobacco: Never Used    Tobacco comment: quit 15-20 years   Substance Use Topics    Alcohol use: No    Drug use: No     Review of Systems   Constitutional: Negative.    Respiratory: Negative.    Cardiovascular: Negative.    Musculoskeletal:        Bilateral knee pain, abrasion/wound noted to right lower extremity.    Skin: Negative.     Neurological: Negative.    Psychiatric/Behavioral: Negative.    All other systems reviewed and are negative.      Physical Exam     Initial Vitals [08/15/22 1710]   BP Pulse Resp Temp SpO2   (!) 175/95 74 20 98.1 °F (36.7 °C) 97 %      MAP       --         Physical Exam    Vitals reviewed.  Constitutional: He appears well-developed and well-nourished.   Neck: Neck supple.   Normal range of motion.  Cardiovascular: Normal rate, regular rhythm, normal heart sounds and intact distal pulses.   Pulmonary/Chest: Breath sounds normal.   Musculoskeletal:         General: Tenderness (left knee) and edema (left knee) present.      Cervical back: Normal range of motion and neck supple.      Comments: Significant amount of bruising noted to left lower extremity from thigh to calf area. Moderate amount noted to right knee area.      Neurological: He is alert and oriented to person, place, and time. He has normal strength.   Skin: Skin is warm and dry. Capillary refill takes less than 2 seconds.        Psychiatric: He has a normal mood and affect. His behavior is normal. Judgment and thought content normal.         Medical Screening Exam   See Full Note    ED Course   Procedures  Labs Reviewed - No data to display       Imaging Results          X-Ray Knee 1 or 2 View Bilateral (Final result)  Result time 08/15/22 19:01:17    Final result by Ernst Villa DO (08/15/22 19:01:17)                 Impression:      As above.    Point of Service: [facility]      Electronically signed by: Ernst Villa  Date:    08/15/2022  Time:    19:01             Narrative:    EXAMINATION:  XR KNEE 1 OR 2 VIEW BILATERAL    CLINICAL HISTORY:  injury;    COMPARISON:  Left knee x-ray October 31, 2012.    TECHNIQUE:  Frontal and lateral views of the bilateral knees.    FINDINGS:  Total bilateral knee arthroplasties.  No acute fracture or dislocation.  No evidence of orthopedic hardware failure.  Significant prepatellar soft tissue  swelling on the left.                                 Medications   ketorolac injection 30 mg (30 mg Intramuscular Given 8/15/22 1908)     Medical Decision Making:   ED Management:  No fracture seen on x-ray. Will place referral to ortho due to patient having a knee replacement on bilateral knees. Patient to continue current antibiotics.                    Clinical Impression:   Final diagnoses:  [S80.02XA] Contusion of left knee, initial encounter (Primary)  [L03.115] Cellulitis of right lower extremity          ED Disposition Condition    Discharge Stable        ED Prescriptions     None        Follow-up Information    None          RJ Choi  08/15/22 1921       RJ Choi  08/15/22 1921

## 2022-08-16 ENCOUNTER — TELEPHONE (OUTPATIENT)
Dept: EMERGENCY MEDICINE | Facility: HOSPITAL | Age: 72
End: 2022-08-16
Payer: COMMERCIAL

## 2022-08-17 ENCOUNTER — TELEPHONE (OUTPATIENT)
Dept: ORTHOPEDICS | Facility: CLINIC | Age: 72
End: 2022-08-17
Payer: COMMERCIAL

## 2023-03-08 ENCOUNTER — OFFICE VISIT (OUTPATIENT)
Dept: FAMILY MEDICINE | Facility: CLINIC | Age: 73
End: 2023-03-08
Payer: MEDICARE

## 2023-03-08 VITALS
WEIGHT: 310 LBS | BODY MASS INDEX: 49.82 KG/M2 | SYSTOLIC BLOOD PRESSURE: 177 MMHG | HEART RATE: 77 BPM | HEIGHT: 66 IN | DIASTOLIC BLOOD PRESSURE: 92 MMHG

## 2023-03-08 DIAGNOSIS — E66.01 MORBID OBESITY WITH BMI OF 40.0-44.9, ADULT: Chronic | ICD-10-CM

## 2023-03-08 DIAGNOSIS — J06.9 UPPER RESPIRATORY TRACT INFECTION, UNSPECIFIED TYPE: Primary | ICD-10-CM

## 2023-03-08 DIAGNOSIS — R52 BODY ACHES: ICD-10-CM

## 2023-03-08 DIAGNOSIS — E11.9 TYPE 2 DIABETES MELLITUS WITHOUT COMPLICATION, WITH LONG-TERM CURRENT USE OF INSULIN: Chronic | ICD-10-CM

## 2023-03-08 DIAGNOSIS — Z20.828 VIRAL DISEASE EXPOSURE: ICD-10-CM

## 2023-03-08 DIAGNOSIS — Z79.4 TYPE 2 DIABETES MELLITUS WITHOUT COMPLICATION, WITH LONG-TERM CURRENT USE OF INSULIN: Chronic | ICD-10-CM

## 2023-03-08 DIAGNOSIS — I10 PRIMARY HYPERTENSION: Chronic | ICD-10-CM

## 2023-03-08 DIAGNOSIS — R05.9 COUGH, UNSPECIFIED TYPE: ICD-10-CM

## 2023-03-08 DIAGNOSIS — I48.91 ATRIAL FIBRILLATION, UNSPECIFIED TYPE: Chronic | ICD-10-CM

## 2023-03-08 LAB
CTP QC/QA: YES
FLUAV AG NPH QL: NEGATIVE
FLUBV AG NPH QL: NEGATIVE
SARS-COV-2 AG RESP QL IA.RAPID: NEGATIVE

## 2023-03-08 PROCEDURE — 99204 OFFICE O/P NEW MOD 45 MIN: CPT | Mod: ,,, | Performed by: FAMILY MEDICINE

## 2023-03-08 PROCEDURE — 3080F PR MOST RECENT DIASTOLIC BLOOD PRESSURE >= 90 MM HG: ICD-10-PCS | Mod: ,,, | Performed by: FAMILY MEDICINE

## 2023-03-08 PROCEDURE — 1101F PT FALLS ASSESS-DOCD LE1/YR: CPT | Mod: ,,, | Performed by: FAMILY MEDICINE

## 2023-03-08 PROCEDURE — 1101F PR PT FALLS ASSESS DOC 0-1 FALLS W/OUT INJ PAST YR: ICD-10-PCS | Mod: ,,, | Performed by: FAMILY MEDICINE

## 2023-03-08 PROCEDURE — 4010F PR ACE/ARB THEARPY RXD/TAKEN: ICD-10-PCS | Mod: ,,, | Performed by: FAMILY MEDICINE

## 2023-03-08 PROCEDURE — 3008F BODY MASS INDEX DOCD: CPT | Mod: ,,, | Performed by: FAMILY MEDICINE

## 2023-03-08 PROCEDURE — 99204 PR OFFICE/OUTPT VISIT, NEW, LEVL IV, 45-59 MIN: ICD-10-PCS | Mod: ,,, | Performed by: FAMILY MEDICINE

## 2023-03-08 PROCEDURE — 4010F ACE/ARB THERAPY RXD/TAKEN: CPT | Mod: ,,, | Performed by: FAMILY MEDICINE

## 2023-03-08 PROCEDURE — 1159F MED LIST DOCD IN RCRD: CPT | Mod: ,,, | Performed by: FAMILY MEDICINE

## 2023-03-08 PROCEDURE — 1159F PR MEDICATION LIST DOCUMENTED IN MEDICAL RECORD: ICD-10-PCS | Mod: ,,, | Performed by: FAMILY MEDICINE

## 2023-03-08 PROCEDURE — 3080F DIAST BP >= 90 MM HG: CPT | Mod: ,,, | Performed by: FAMILY MEDICINE

## 2023-03-08 PROCEDURE — 3077F SYST BP >= 140 MM HG: CPT | Mod: ,,, | Performed by: FAMILY MEDICINE

## 2023-03-08 PROCEDURE — 3077F PR MOST RECENT SYSTOLIC BLOOD PRESSURE >= 140 MM HG: ICD-10-PCS | Mod: ,,, | Performed by: FAMILY MEDICINE

## 2023-03-08 PROCEDURE — 3288F PR FALLS RISK ASSESSMENT DOCUMENTED: ICD-10-PCS | Mod: ,,, | Performed by: FAMILY MEDICINE

## 2023-03-08 PROCEDURE — 3288F FALL RISK ASSESSMENT DOCD: CPT | Mod: ,,, | Performed by: FAMILY MEDICINE

## 2023-03-08 PROCEDURE — 3008F PR BODY MASS INDEX (BMI) DOCUMENTED: ICD-10-PCS | Mod: ,,, | Performed by: FAMILY MEDICINE

## 2023-03-08 PROCEDURE — 87428 SARSCOV & INF VIR A&B AG IA: CPT | Mod: RHCUB | Performed by: FAMILY MEDICINE

## 2023-03-08 RX ORDER — GUAIFENESIN 600 MG/1
1200 TABLET, EXTENDED RELEASE ORAL 2 TIMES DAILY
Qty: 40 TABLET | Refills: 0 | Status: SHIPPED | OUTPATIENT
Start: 2023-03-08 | End: 2023-03-18

## 2023-03-08 RX ORDER — CEFDINIR 300 MG/1
300 CAPSULE ORAL 2 TIMES DAILY
Qty: 10 CAPSULE | Refills: 0 | Status: SHIPPED | OUTPATIENT
Start: 2023-03-08 | End: 2023-03-13

## 2023-03-08 RX ORDER — ACETAMINOPHEN AND CODEINE PHOSPHATE 300; 30 MG/1; MG/1
1 TABLET ORAL EVERY 6 HOURS PRN
Qty: 30 TABLET | Refills: 0 | Status: SHIPPED | OUTPATIENT
Start: 2023-03-08 | End: 2023-03-18

## 2023-03-08 NOTE — PROGRESS NOTES
Edison Tran MD   [facility]  [facility] [address] [address]  [phone]      PATIENT NAME: Valentino Penn  : 1950  DATE: 3/8/23  MRN: [id number]      Billing Provider: Eidson Tran MD  Level of Service:   Patient PCP Information       Provider PCP Type    Edison Tran MD General            Reason for Visit / Chief Complaint: Cough, Sore Throat, and Generalized Body Aches       Update PCP  Update Chief Complaint         History of Present Illness / Problem Focused Workflow     Valentino Penn presents to the clinic with Cough, Sore Throat, and Generalized Body Aches       73 yo WM with HTN, DM and afib.  Has had flu-like illness x 2 days.  Says that he is unable to sleep due to cough and body aches.  He has been taking coricidin HBP.  Is on blood thinner.  No obvious sick contacts.  PCP is in [place].      Review of Systems     Review of Systems   Constitutional:  Positive for appetite change and fatigue.   HENT:  Positive for sinus pressure/congestion and sore throat.    Respiratory:  Positive for cough.    Musculoskeletal:  Positive for myalgias.   Neurological:  Positive for headaches.   Psychiatric/Behavioral:  Positive for sleep disturbance.       Medical / Social / Family History     Past Medical History:   Diagnosis Date    Cataract     Cyst, kidney, acquired     Diabetes mellitus     Hypertension     Kidney stones        Past Surgical History:   Procedure Laterality Date    CARPAL TUNNEL RELEASE Left     ELBOW FUSION Right     KIDNEY STONE SURGERY      SHOULDER SURGERY Right     spur scraped    TOTAL KNEE ARTHROPLASTY Bilateral        Social History    reports that he quit smoking about 33 years ago. His smoking use included cigarettes. He started smoking about 61 years ago. He has a 54.00 pack-year smoking history. He has never been exposed to tobacco smoke. He has never used smokeless tobacco. He  "reports that he does not drink alcohol and does not use drugs.   Social History     Tobacco Use    Smoking status: Former     Packs/day: 2.00     Years: 27.00     Pack years: 54.00     Types: Cigarettes     Start date: 1962     Quit date: 12/3/1989     Years since quittin.2     Passive exposure: Never    Smokeless tobacco: Never    Tobacco comments:     quit 15-20 years   Substance Use Topics    Alcohol use: No    Drug use: No       Family History  Family History   Problem Relation Age of Onset    Diabetes Mother     Hypertension Mother     Cancer Father        Medications and Allergies     Medications  Outpatient Medications Marked as Taking for the 3/8/23 encounter (Office Visit) with Edison Tran MD   Medication Sig Dispense Refill    ACCU-CHEK FASTCLIX Misc Dispense box of 100 with 11 refills. Use as directed 100 each 11    amiodarone (PACERONE) 200 MG Tab Take 100 mg by mouth once daily.      amlodipine (NORVASC) 10 MG tablet       ammonium lactate (LAC-HYDRIN) 12 % lotion Apply topically.      atorvastatin (LIPITOR) 20 MG tablet Take 20 mg by mouth.      BD INSULIN PEN NEEDLE UF MINI 31 gauge x 3/16" Ndle Inject 1 Device into the skin 4 (four) times daily with meals and nightly. 100 each 11    BD ULTRA-FINE ITZEL PEN NEEDLES 32 gauge x 5/32" Ndle       blood sugar diagnostic (ACCU-CHEK SMARTVIEW TEST STRIP) Strp Inject 1 strip into the skin 4 (four) times daily with meals and nightly. Use daily 100 each 11    BLOOD-GLUCOSE METER (ACCU-CHEK ITZEL MISC) 1 each by Misc.(Non-Drug; Combo Route) route.      cetirizine (ZYRTEC) 10 MG tablet Take 1 tablet (10 mg total) by mouth once daily. 30 tablet 0    clobetasoL (TEMOVATE) 0.05 % cream SMARTSIG:Sparingly Topical Twice Daily      doxepin (SINEQUAN) 50 MG capsule Take 1 capsule by mouth 2 (two) times daily.      ELIQUIS 5 mg Tab Take 5 mg by mouth 2 (two) times daily.      fluticasone (FLONASE) 50 mcg/actuation nasal spray 2 sprays by Nasal route daily " as needed.       glimepiride (AMARYL) 4 MG tablet       hydrALAZINE (APRESOLINE) 25 MG tablet Take two tablets by mouth three times daily  for blood pressure (Patient taking differently: 50 mg every 8 (eight) hours.) 540 tablet 3    hydrocortisone 2.5 % lotion once as needed.       LEVEMIR FLEXTOUCH 100 unit/mL (3 mL) InPn pen Inject 44 Units into the skin every evening.       magnesium oxide (MAG-OX) 400 mg tablet Take 1 tablet (400 mg total) by mouth 2 (two) times daily. (Patient taking differently: Take 500 mg by mouth 2 (two) times daily.)      metformin (GLUCOPHAGE) 1000 MG tablet Take 1 tablet (1,000 mg total) by mouth 2 (two) times daily. for diabetes. 180 tablet 3    metoprolol tartrate (LOPRESSOR) 50 MG tablet Take 1 tablet by mouth 2 (two) times daily.      NOVOLOG FLEXPEN 100 unit/mL InPn pen Inject 15 Units into the skin 3 (three) times daily.      omega-3 fatty acids-vitamin E (FISH OIL) 1,000 mg Cap Take 2 g by mouth 2 (two) times daily.  0    ondansetron (ZOFRAN) 4 MG tablet Take 1 tablet (4 mg total) by mouth every 6 (six) hours as needed for Nausea. 12 tablet 0    ondansetron (ZOFRAN-ODT) 4 MG TbDL Take 1 tablet (4 mg total) by mouth every 8 (eight) hours as needed (nausea/vomiting). 10 tablet 0    potassium chloride SA (K-DUR,KLOR-CON) 20 MEQ tablet Take 20 mEq by mouth once. 5 pills daily      SITagliptin (JANUVIA) 100 MG Tab Take by mouth once daily.      spironolactone (ALDACTONE) 50 MG tablet       TRULICITY 0.75 mg/0.5 mL pen injector Inject into the skin.      valsartan (DIOVAN) 160 MG tablet Take by mouth.      zolpidem (AMBIEN) 10 mg Tab Take 10 mg by mouth nightly as needed.          Allergies  Review of patient's allergies indicates:   Allergen Reactions    Bactrim [sulfamethoxazole-trimethoprim] Nausea And Vomiting    Sulfa (sulfonamide antibiotics) Nausea And Vomiting    Adhesive Rash    Latex, natural rubber Rash       Physical Examination     Vitals:    03/08/23 1446   BP: (!) 177/92    Pulse: 77     Physical Exam  Vitals reviewed.   Constitutional:       General: He is not in acute distress.     Appearance: Normal appearance. He is obese. He is ill-appearing. He is not toxic-appearing.   HENT:      Head: Normocephalic and atraumatic.   Eyes:      Extraocular Movements: Extraocular movements intact.      Conjunctiva/sclera: Conjunctivae normal.      Pupils: Pupils are equal, round, and reactive to light.   Cardiovascular:      Rate and Rhythm: Normal rate and regular rhythm.   Pulmonary:      Effort: Pulmonary effort is normal. No respiratory distress.      Breath sounds: Normal breath sounds. No wheezing, rhonchi or rales.      Comments: +cough  Musculoskeletal:         General: Normal range of motion.      Cervical back: Normal range of motion and neck supple.   Skin:     General: Skin is warm and dry.   Neurological:      General: No focal deficit present.      Mental Status: He is alert and oriented to person, place, and time.   Psychiatric:         Mood and Affect: Mood normal.         Behavior: Behavior normal.        Assessment and Plan (including Health Maintenance)      Problem List  Smart Sets  Document Outside HM   :    Plan: avoid decongestant medications due to HTN and afib.  Rx for antibiotic  to be filled in several days if no improvement or if gets worse over the weekend.  He was given home COVID-19 tests in case he wants to retest in several days.  Advised to drink adequate water while taking expectorant.          Health Maintenance Due   Topic Date Due    Shingles Vaccine (1 of 2) Never done    Pneumococcal Vaccines (Age 65+) (1 - PCV) Never done    Hemoglobin A1c  07/18/2017    Lipid Panel  03/07/2021    COVID-19 Vaccine (3 - Moderna risk series) 01/11/2022    Colorectal Cancer Screening  08/01/2022    Influenza Vaccine (1) 09/01/2022       Problem List Items Addressed This Visit          Cardiac/Vascular    HTN (hypertension)       Endocrine    Morbid obesity with BMI of  40.0-44.9, adult (Chronic)    Type 2 diabetes mellitus     Other Visit Diagnoses       Upper respiratory tract infection, unspecified type    -  Primary    Relevant Medications    acetaminophen-codeine 300-30mg (TYLENOL #3) 300-30 mg Tab    cefdinir (OMNICEF) 300 MG capsule    Viral disease exposure        Relevant Orders    POCT SARS-COV2 (COVID) with Flu Antigen (Completed)    Cough, unspecified type        Relevant Medications    acetaminophen-codeine 300-30mg (TYLENOL #3) 300-30 mg Tab    Body aches        Relevant Medications    acetaminophen-codeine 300-30mg (TYLENOL #3) 300-30 mg Tab    Atrial fibrillation, unspecified type  (Chronic)               Health Maintenance Topics with due status: Not Due       Topic Last Completion Date    TETANUS VACCINE 02/22/2016       No future appointments.         Signature:  MD KEVIN Ivey [facility]    MEDICAL GROUP [facility]  [address] [place] [address] [place] [id number]  [phone]    Date of encounter: 3/8/23

## 2023-03-28 ENCOUNTER — HOSPITAL ENCOUNTER (EMERGENCY)
Facility: HOSPITAL | Age: 73
Discharge: HOME | End: 2023-03-28
Attending: INTERNAL MEDICINE
Payer: MEDICARE

## 2023-03-28 VITALS
TEMPERATURE: 98 F | SYSTOLIC BLOOD PRESSURE: 165 MMHG | RESPIRATION RATE: 20 BRPM | WEIGHT: 304 LBS | HEART RATE: 68 BPM | BODY MASS INDEX: 48.86 KG/M2 | OXYGEN SATURATION: 98 % | DIASTOLIC BLOOD PRESSURE: 85 MMHG | HEIGHT: 66 IN

## 2023-03-28 DIAGNOSIS — M25.569 KNEE PAIN: ICD-10-CM

## 2023-03-28 LAB — POCT GLUCOSE: 129 MG/DL (ref 70–110)

## 2023-03-28 PROCEDURE — 82962 GLUCOSE BLOOD TEST: CPT | Mod: ER

## 2023-03-28 PROCEDURE — 99285 EMERGENCY DEPT VISIT HI MDM: CPT | Mod: 25,ER

## 2023-03-28 RX ORDER — ACETAMINOPHEN 500 MG
500 TABLET ORAL EVERY 6 HOURS PRN
Qty: 30 TABLET | Refills: 0 | Status: SHIPPED | OUTPATIENT
Start: 2023-03-28

## 2023-03-28 RX ORDER — HYDROCODONE BITARTRATE AND ACETAMINOPHEN 5; 325 MG/1; MG/1
1 TABLET ORAL EVERY 6 HOURS PRN
Qty: 11 TABLET | Refills: 0 | Status: SHIPPED | OUTPATIENT
Start: 2023-03-28 | End: 2023-03-31

## 2023-03-28 NOTE — FIRST PROVIDER EVALUATION
Emergency Department TeleTriage Encounter Note      CHIEF COMPLAINT    Chief Complaint   Patient presents with    Knee Pain     Valentino Penn, a 72 y.o. male presents to the ED via PV with CC of R knee pain that started after the pt had a 3 hr drive on yesterday. Pt can not bear weight on the R knee. Pt reports pmhx of Bilateral knee replacement. Pt denies N/VD CP/SOB           VITAL SIGNS   Initial Vitals [03/28/23 1533]   BP Pulse Resp Temp SpO2   125/75 61 18 97.9 °F (36.6 °C) 96 %      MAP       --            ALLERGIES    Review of patient's allergies indicates:   Allergen Reactions    Bactrim [sulfamethoxazole-trimethoprim] Nausea And Vomiting    Sulfa (sulfonamide antibiotics) Nausea And Vomiting    Adhesive Rash    Latex, natural rubber Rash       PROVIDER TRIAGE NOTE  TeleTriage Note: Valentino Penn, a nontoxic/well appearing, 72 y.o. male, presented to the ED with c/o right leg stiffness that began yesterday after driving for over 3 hours. Pt states that it is very painful to walk. Denies swelling.     All ED beds are full at present; patient notified of this status.  Patient seen and medically screened by Nurse Practitioner via teletriage. Orders initiated at triage to expedite care.  Patient is stable to return to the waiting room and will be placed in an ED bed when available.  Care will be transferred to an alternate provider when patient has been placed in an Exam Room from the [facility] for physical exam, additional orders, and disposition.  4:37 PM Giulia Montoya DNP, FNP-C        ORDERS  Labs Reviewed   POCT GLUCOSE - Abnormal; Notable for the following components:       Result Value    POCT Glucose 129 (*)     All other components within normal limits       ED Orders (720h ago, onward)      Start Ordered     Status Ordering Provider    03/28/23 1537 03/28/23 1537  POCT glucose  Once         Final result EMERGENCY, DEPT PHYSICIAN              Virtual Visit Note: The provider triage portion of  this emergency department evaluation and documentation was performed via [employer], a HIPAA-compliant telemedicine application, in concert with a tele-presenter in the room. A face to face patient evaluation with one of my colleagues will occur once the patient is placed in an emergency department room.      DISCLAIMER: This note was prepared with [employer] voice recognition transcription software. Garbled syntax, mangled pronouns, and other bizarre constructions may be attributed to that software system.

## 2023-03-28 NOTE — ED TRIAGE NOTES
Pt arrived to the ED via personal transport due to bilateral knee pain. Pt reports hx of bilateral knee replacements x10 years ago and hx of arthritis. Pt reports after riding in truck for 3 hrs, he was unable to bear weight on right lower extremity. Patient took unknown pain pill PTA. Pain 8/10 at rest, 10/10 with ambulating. Pt uses wheelchair for assistance. Alert and oriented x4.

## 2023-03-29 NOTE — DISCHARGE INSTRUCTIONS

## 2023-03-29 NOTE — ED PROVIDER NOTES
Encounter Date: 3/28/2023       History     Chief Complaint   Patient presents with    Knee Pain     Valentino Penn, a 72 y.o. male presents to the ED via PV with CC of R knee pain that started after the pt had a 3 hr drive on yesterday. Pt can not bear weight on the R knee. Pt reports pmhx of Bilateral knee replacement. Pt denies N/VD CP/SOB         72-year-old male with past medical history of diabetes type 2, hypertension, hyperlipidemia, AFib presents to ED complaining of acute onset right knee pain that started yesterday.  Patient states that he was driving for 3 hours yesterday and noticed any pain.  He reports history of knee replacement 10-12 years ago.  He attempted ibuprofen and hydrocodone yesterday with transient relief.  He denies any new trauma.  He denies any leg swelling, dysuria, hematuria, fever, chills, chest pain, shortness of breath, abdominal pain, nausea, vomiting, diarrhea, dysuria, hematuria.  No other symptoms reported.      The history is provided by the patient. No  was used.   Review of patient's allergies indicates:   Allergen Reactions    Bactrim [sulfamethoxazole-trimethoprim] Nausea And Vomiting    Sulfa (sulfonamide antibiotics) Nausea And Vomiting    Adhesive Rash    Latex, natural rubber Rash     Past Medical History:   Diagnosis Date    Cataract     Cyst, kidney, acquired     Diabetes mellitus     Hypertension     Kidney stones      Past Surgical History:   Procedure Laterality Date    CARPAL TUNNEL RELEASE Left     ELBOW FUSION Right     KIDNEY STONE SURGERY      SHOULDER SURGERY Right     spur scraped    TOTAL KNEE ARTHROPLASTY Bilateral      Family History   Problem Relation Age of Onset    Diabetes Mother     Hypertension Mother     Cancer Father      Social History     Tobacco Use    Smoking status: Former     Packs/day: 2.00     Years: 27.00     Pack years: 54.00     Types: Cigarettes     Start date: 2/25/1962     Quit date: 12/3/1989     Years since  quittin.3     Passive exposure: Never    Smokeless tobacco: Never    Tobacco comments:     quit 15-20 years   Substance Use Topics    Alcohol use: No    Drug use: No     Review of Systems   Constitutional:  Negative for chills and fever.   HENT:  Negative for congestion, ear pain, rhinorrhea and sore throat.    Eyes:  Negative for redness.   Respiratory:  Negative for cough and shortness of breath.    Cardiovascular:  Negative for chest pain.   Gastrointestinal:  Negative for abdominal pain, diarrhea, nausea and vomiting.   Genitourinary:  Negative for decreased urine volume, difficulty urinating, dysuria, frequency, hematuria and urgency.   Musculoskeletal:  Positive for arthralgias. Negative for back pain and neck pain.   Skin:  Negative for rash.   Neurological:  Negative for headaches.   Psychiatric/Behavioral:  Negative for confusion.      Physical Exam     Initial Vitals [23 1533]   BP Pulse Resp Temp SpO2   125/75 61 18 97.9 °F (36.6 °C) 96 %      MAP       --         Physical Exam    Nursing note and vitals reviewed.  Constitutional: He appears well-developed and well-nourished. He is not diaphoretic.  Non-toxic appearance. No distress.   HENT:   Head: Normocephalic and atraumatic.   Right Ear: Hearing, tympanic membrane, external ear and ear canal normal. Tympanic membrane is not perforated, not erythematous and not bulging.   Left Ear: Hearing, tympanic membrane, external ear and ear canal normal. Tympanic membrane is not perforated, not erythematous and not bulging.   Nose: Nose normal.   Mouth/Throat: Uvula is midline and oropharynx is clear and moist.   Eyes: Conjunctivae and EOM are normal.   Neck: Neck supple.   Normal range of motion.   Full passive range of motion without pain.     Cardiovascular:            Pulses:       Radial pulses are 2+ on the right side and 2+ on the left side.   Musculoskeletal:      Cervical back: Full passive range of motion without pain, normal range of motion  and neck supple. No rigidity.      Comments: Patella intact bilaterally.  No ACL, PCL, LCL, MCL laxity towards bilateral knees.  No surrounding erythema, edema, bruising, rash, or cellulitis.  Full range of motion of bilateral hips, knees, ankles, toes.  Strength and sensation intact to bilateral lower extremities.  Negative Homans sign bilaterally.     Neurological: He is alert. No cranial nerve deficit.   Neuro intact.  Strength and sensation intact to bilateral upper and lower extremities.   Skin: Skin is warm and dry. No rash noted.       ED Course   Procedures  Labs Reviewed   POCT GLUCOSE - Abnormal; Notable for the following components:       Result Value    POCT Glucose 129 (*)     All other components within normal limits          Imaging Results              US Lower Extremity Veins Right (Final result)  Result time 03/28/23 19:06:28      Final result by Karlee Nolasco MD (03/28/23 19:06:28)                   Impression:      No evidence of right deep venous thrombosis.    Baker's cyst.      Electronically signed by: Karlee Nolasco  Date:    03/28/2023  Time:    19:06               Narrative:    EXAMINATION:  ULTRASOUND LOWER EXTREMITY VEINS RIGHT    CLINICAL HISTORY:  Pain in unspecified knee    TECHNIQUE:  Grayscale imaging of the right lower extremity to evaluate the deep and superficial venous system with color Doppler interrogation and Spectral Doppler wave form analysis was performed.    COMPARISON:  None.    FINDINGS:  There is normal color Doppler interrogation, compression and distal augmentation of the right common femoral, femoral, greater saphenous, popliteal, posterior tibial, anterior tibial, and peroneal veins.  There is an anechoic area in the popliteal fossa measuring 3.3 x 2.2 x 2.7 cm.                                       X-Ray Knee 3 View Right (Final result)  Result time 03/28/23 19:04:53      Final result by Israel Kay MD (03/28/23 19:04:53)                    Impression:      1. No acute displaced fracture or dislocation of the right knee.      Electronically signed by: Israel Kay MD  Date:    03/28/2023  Time:    19:04               Narrative:    EXAMINATION:  XR KNEE 3 VIEW RIGHT    CLINICAL HISTORY:  Pain in unspecified knee    TECHNIQUE:  AP, lateral, and Merchant views of the right knee were performed.    COMPARISON:  08/15/2022    FINDINGS:  Three views right knee.    Right knee arthroplasty appears intact without loosening or dislocation.  No radiopaque foreign body.  No large knee joint effusion.  There is osteopenia.                                       Medications - No data to display  Medical Decision Making:   Clinical Tests:   Radiological Study: Ordered and Reviewed  ED Management:  This is a 72-year-old male with past medical history of diabetes type 2, hypertension, hyperlipidemia, AFib presents to ED complaining of acute onset right knee pain that started yesterday.     On physical exam, patient is well-appearing and in no acute distress.  Nontoxic appearing.  Lungs are clear to auscultation bilaterally.  Abdomen is soft and nontender.  No guarding, rigidity, rebound.  2+ radial pulses bilaterally.  Posterior oropharynx is not erythematous.  No edema or exudate.  Uvula midline.  Bilateral tympanic membrane is normal.  No erythema, bulging, or perforations.  Neuro intact.  Strength and sensation intact bilateral upper and lower extremities. Patella intact bilaterally.  No ACL, PCL, LCL, MCL laxity towards bilateral knees.  No surrounding erythema, edema, bruising, rash, or cellulitis.  Full range of motion of bilateral hips, knees, ankles, toes.  Strength and sensation intact to bilateral lower extremities.  Negative Homans sign bilaterally.  Point of care glucose 129.  Ultrasound revealed no evidence of any DVTs.  Suh's cyst present.  X-rays revealed no evidence of any acute fractures or dislocations.  Discharge patient on Tylenol and a short  course of pain medicine.  Urged prompt follow-up with orthopedist and PCP for further evaluation.  Ambulatory referral to orthopedist ordered.    Strict return precautions given. I discussed with the patient/family the diagnosis, treatment plan, indications for return to the emergency department, and for expected follow-up. The patient/family verbalized an understanding. The patient/family is asked if there are any questions or concerns. We discuss the case, until all issues are addressed to the patient/family's satisfaction. Patient/family understands and is agreeable to the plan. Patient is stable and ready for discharge.                          Clinical Impression:   Final diagnoses:  [M25.569] Knee pain        ED Disposition Condition    Discharge Stable          ED Prescriptions       Medication Sig Dispense Start Date End Date Auth. Provider    acetaminophen (TYLENOL) 500 MG tablet Take 1 tablet (500 mg total) by mouth every 6 (six) hours as needed for Pain. 30 tablet 3/28/2023 -- Mer Templeton PA-C    HYDROcodone-acetaminophen (NORCO) 5-325 mg per tablet Take 1 tablet by mouth every 6 (six) hours as needed for Pain. 11 tablet 3/28/2023 3/31/2023 Mer Templeton PA-C          Follow-up Information       Follow up With Specialties Details Why Contact Info    Jose Donald MD Internal Medicine In 2 days for further evaluation [address] [facility] [address]  [phone]      [facility] ED Emergency Medicine In 2 days If symptoms worsen [address] [facility] [id number] [phone]             Mer Templeton PA-C  03/29/23 0110

## 2023-07-24 ENCOUNTER — HOSPITAL ENCOUNTER (EMERGENCY)
Facility: HOSPITAL | Age: 73
Discharge: ANOTHER HOSPITAL | End: 2023-07-25
Payer: MEDICARE

## 2023-07-24 DIAGNOSIS — T78.40XA ALLERGIC REACTION, INITIAL ENCOUNTER: Primary | ICD-10-CM

## 2023-07-24 DIAGNOSIS — R22.0 TONGUE SWELLING: ICD-10-CM

## 2023-07-24 DIAGNOSIS — K13.79 UVULAR SWELLING: ICD-10-CM

## 2023-07-24 PROCEDURE — 96374 THER/PROPH/DIAG INJ IV PUSH: CPT

## 2023-07-24 PROCEDURE — 99285 EMERGENCY DEPT VISIT HI MDM: CPT | Mod: 25

## 2023-07-24 PROCEDURE — 96375 TX/PRO/DX INJ NEW DRUG ADDON: CPT

## 2023-07-24 PROCEDURE — 63600175 PHARM REV CODE 636 W HCPCS: Performed by: NURSE PRACTITIONER

## 2023-07-24 PROCEDURE — 25000003 PHARM REV CODE 250: Performed by: NURSE PRACTITIONER

## 2023-07-24 PROCEDURE — 99285 EMERGENCY DEPT VISIT HI MDM: CPT | Mod: GF | Performed by: NURSE PRACTITIONER

## 2023-07-24 RX ORDER — DIPHENHYDRAMINE HYDROCHLORIDE 50 MG/ML
25 INJECTION INTRAMUSCULAR; INTRAVENOUS
Status: COMPLETED | OUTPATIENT
Start: 2023-07-24 | End: 2023-07-24

## 2023-07-24 RX ORDER — METHYLPREDNISOLONE SOD SUCC 125 MG
125 VIAL (EA) INJECTION
Status: COMPLETED | OUTPATIENT
Start: 2023-07-24 | End: 2023-07-24

## 2023-07-24 RX ORDER — FAMOTIDINE 10 MG/ML
20 INJECTION INTRAVENOUS
Status: COMPLETED | OUTPATIENT
Start: 2023-07-24 | End: 2023-07-24

## 2023-07-24 RX ADMIN — METHYLPREDNISOLONE SODIUM SUCCINATE 125 MG: 125 INJECTION, POWDER, FOR SOLUTION INTRAMUSCULAR; INTRAVENOUS at 11:07

## 2023-07-24 RX ADMIN — FAMOTIDINE 20 MG: 10 INJECTION, SOLUTION INTRAVENOUS at 11:07

## 2023-07-24 RX ADMIN — DIPHENHYDRAMINE HYDROCHLORIDE 25 MG: 50 INJECTION INTRAMUSCULAR; INTRAVENOUS at 11:07

## 2023-07-25 VITALS
DIASTOLIC BLOOD PRESSURE: 93 MMHG | HEIGHT: 68 IN | BODY MASS INDEX: 47.19 KG/M2 | TEMPERATURE: 99 F | HEART RATE: 75 BPM | RESPIRATION RATE: 18 BRPM | WEIGHT: 311.38 LBS | SYSTOLIC BLOOD PRESSURE: 175 MMHG | OXYGEN SATURATION: 99 %

## 2023-07-25 LAB
ALBUMIN SERPL BCP-MCNC: 3.4 G/DL (ref 3.5–5)
ALBUMIN/GLOB SERPL: 0.9 {RATIO}
ALP SERPL-CCNC: 111 U/L (ref 45–115)
ALT SERPL W P-5'-P-CCNC: 23 U/L (ref 16–61)
ANION GAP SERPL CALCULATED.3IONS-SCNC: 15 MMOL/L (ref 7–16)
AST SERPL W P-5'-P-CCNC: 15 U/L (ref 15–37)
BASOPHILS # BLD AUTO: 0.03 K/UL (ref 0–0.2)
BASOPHILS NFR BLD AUTO: 0.2 % (ref 0–1)
BILIRUB SERPL-MCNC: 0.5 MG/DL (ref ?–1.2)
BUN SERPL-MCNC: 28 MG/DL (ref 7–18)
BUN/CREAT SERPL: 20 (ref 6–20)
CALCIUM SERPL-MCNC: 9.4 MG/DL (ref 8.5–10.1)
CHLORIDE SERPL-SCNC: 102 MMOL/L (ref 98–107)
CO2 SERPL-SCNC: 26 MMOL/L (ref 21–32)
CREAT SERPL-MCNC: 1.4 MG/DL (ref 0.7–1.3)
DIFFERENTIAL METHOD BLD: ABNORMAL
EGFR (NO RACE VARIABLE) (RUSH/TITUS): 53 ML/MIN/1.73M2
EOSINOPHIL # BLD AUTO: 0.33 K/UL (ref 0–0.5)
EOSINOPHIL NFR BLD AUTO: 2.6 % (ref 1–4)
ERYTHROCYTE [DISTWIDTH] IN BLOOD BY AUTOMATED COUNT: 13.6 % (ref 11.5–14.5)
GLOBULIN SER-MCNC: 3.9 G/DL (ref 2–4)
GLUCOSE SERPL-MCNC: 202 MG/DL (ref 74–106)
HCT VFR BLD AUTO: 45.5 % (ref 40–54)
HGB BLD-MCNC: 14.6 G/DL (ref 13.5–18)
LYMPHOCYTES # BLD AUTO: 0.99 K/UL (ref 1–4.8)
LYMPHOCYTES NFR BLD AUTO: 7.9 % (ref 27–41)
LYMPHOCYTES NFR BLD MANUAL: 8 % (ref 27–41)
MCH RBC QN AUTO: 29.9 PG (ref 27–31)
MCHC RBC AUTO-ENTMCNC: 32.1 G/DL (ref 32–36)
MCV RBC AUTO: 93.2 FL (ref 80–96)
MONOCYTES # BLD AUTO: 0.63 K/UL (ref 0–0.8)
MONOCYTES NFR BLD AUTO: 5.1 % (ref 2–6)
MONOCYTES NFR BLD MANUAL: 3 % (ref 2–6)
MPC BLD CALC-MCNC: 9.2 FL (ref 9.4–12.4)
NEUTROPHILS # BLD AUTO: 10.48 K/UL (ref 1.8–7.7)
NEUTROPHILS NFR BLD AUTO: 84.2 % (ref 53–65)
NEUTS BAND NFR BLD MANUAL: 2 % (ref 1–5)
NEUTS SEG NFR BLD MANUAL: 87 % (ref 50–62)
NRBC BLD MANUAL-RTO: ABNORMAL %
PLATELET # BLD AUTO: 193 K/UL (ref 150–400)
PLATELET MORPHOLOGY: ABNORMAL
POTASSIUM SERPL-SCNC: 4.2 MMOL/L (ref 3.5–5.1)
PROT SERPL-MCNC: 7.3 G/DL (ref 6.4–8.2)
RAPID GROUP A STREP: NEGATIVE
RBC # BLD AUTO: 4.88 M/UL (ref 4.6–6.2)
RBC MORPH BLD: NORMAL
SODIUM SERPL-SCNC: 139 MMOL/L (ref 136–145)
WBC # BLD AUTO: 12.46 K/UL (ref 4.5–11)

## 2023-07-25 PROCEDURE — 85025 COMPLETE CBC W/AUTO DIFF WBC: CPT | Performed by: NURSE PRACTITIONER

## 2023-07-25 PROCEDURE — 96376 TX/PRO/DX INJ SAME DRUG ADON: CPT

## 2023-07-25 PROCEDURE — 25000003 PHARM REV CODE 250: Performed by: NURSE PRACTITIONER

## 2023-07-25 PROCEDURE — 80053 COMPREHEN METABOLIC PANEL: CPT | Performed by: NURSE PRACTITIONER

## 2023-07-25 PROCEDURE — 87880 STREP A ASSAY W/OPTIC: CPT | Performed by: NURSE PRACTITIONER

## 2023-07-25 PROCEDURE — 63600175 PHARM REV CODE 636 W HCPCS: Performed by: NURSE PRACTITIONER

## 2023-07-25 RX ORDER — DIPHENHYDRAMINE HYDROCHLORIDE 50 MG/ML
25 INJECTION INTRAMUSCULAR; INTRAVENOUS
Status: COMPLETED | OUTPATIENT
Start: 2023-07-25 | End: 2023-07-25

## 2023-07-25 RX ADMIN — DIPHENHYDRAMINE HYDROCHLORIDE 25 MG: 50 INJECTION INTRAMUSCULAR; INTRAVENOUS at 12:07

## 2023-07-25 RX ADMIN — AMPICILLIN SODIUM AND SULBACTAM SODIUM 3 G: 2; 1 INJECTION, POWDER, FOR SOLUTION INTRAMUSCULAR; INTRAVENOUS at 01:07

## 2023-07-25 NOTE — ED PROVIDER NOTES
Encounter Date: 2023       History     Chief Complaint   Patient presents with    Urticaria    Oral Swelling     Presented with wife c/o swelling to face, tongue and throat that started out as hives 3 days ago and progressed around  with difficulty with speech, swallowing and breathing. States face and mouth have been swelling over the last 2 days but has gotten worse. States hives were mostly around waistband and legs. Has been taking Benadryl around to clock with some improvement with hives but continued progression swelling to mouth. Denies use of ACEI, ARB or any new medication. Wife says that he use to take ACEI but after her brother in law on her side of the family developed angioedema some years ago, they requested it be d/c'd some years ago. Denies personal family history of angioedema.     Review of patient's allergies indicates:   Allergen Reactions    Bactrim [sulfamethoxazole-trimethoprim] Nausea And Vomiting    Sulfa (sulfonamide antibiotics) Nausea And Vomiting    Adhesive Rash    Latex, natural rubber Rash     Past Medical History:   Diagnosis Date    Cataract     Cyst, kidney, acquired     Diabetes mellitus     Hypertension     Kidney stones      Past Surgical History:   Procedure Laterality Date    CARPAL TUNNEL RELEASE Left     ELBOW FUSION Right     KIDNEY STONE SURGERY      SHOULDER SURGERY Right     spur scraped    TOTAL KNEE ARTHROPLASTY Bilateral      Family History   Problem Relation Age of Onset    Diabetes Mother     Hypertension Mother     Cancer Father      Social History     Tobacco Use    Smoking status: Former     Packs/day: 2.00     Years: 27.00     Pack years: 54.00     Types: Cigarettes     Start date: 1962     Quit date: 12/3/1989     Years since quittin.6     Passive exposure: Never    Smokeless tobacco: Never    Tobacco comments:     quit 15-20 years   Substance Use Topics    Alcohol use: No    Drug use: No     Review of Systems   Constitutional:  Positive for  activity change. Negative for appetite change and fever.   HENT:  Positive for facial swelling, sore throat (tightness), trouble swallowing and voice change. Negative for rhinorrhea.    Eyes: Negative.    Respiratory:  Positive for shortness of breath. Negative for cough, chest tightness and wheezing.    Cardiovascular:  Positive for leg swelling (chronic). Negative for chest pain.   Gastrointestinal:  Negative for abdominal pain, diarrhea, nausea and vomiting.   Genitourinary: Negative.    Musculoskeletal: Negative.    Neurological: Negative.    Psychiatric/Behavioral: Negative.       Physical Exam     Initial Vitals   BP Pulse Resp Temp SpO2   07/24/23 0114 07/24/23 0114 07/24/23 0114 07/24/23 2323 07/24/23 0114   (!) 172/85 (!) 18 18 98.8 °F (37.1 °C) (!) 88 %      MAP       --                Physical Exam    Nursing note and vitals reviewed.  Constitutional: He appears well-developed and well-nourished. No distress.   HENT:   Head: Normocephalic.   Right Ear: External ear normal.   Left Ear: External ear normal.   Mouth/Throat: Mucous membranes are normal. There is trismus in the jaw. Uvula swelling present. No oropharyngeal exudate.   Uvula, tongue and facial swelling. No pharyngeal erythema or exudate.     Eyes: Conjunctivae and EOM are normal. Pupils are equal, round, and reactive to light.   Neck: Neck supple.   Normal range of motion.  Cardiovascular:  Normal rate, regular rhythm, normal heart sounds and intact distal pulses.           No murmur heard.  Pulmonary/Chest: Breath sounds normal. No respiratory distress. He has no wheezes. He has no rhonchi. He has no rales.   Abdominal: Abdomen is soft. Bowel sounds are normal. There is no abdominal tenderness.   Musculoskeletal:         General: Normal range of motion.      Cervical back: Normal range of motion and neck supple.     Neurological: He is alert and oriented to person, place, and time. He has normal strength. GCS score is 15. GCS eye subscore is 4.  GCS verbal subscore is 5. GCS motor subscore is 6.   Skin: Skin is warm and dry. Capillary refill takes less than 2 seconds.   Psychiatric: He has a normal mood and affect. His behavior is normal. Judgment and thought content normal.       Medical Screening Exam   See Full Note    ED Course   Procedures  Labs Reviewed   COMPREHENSIVE METABOLIC PANEL - Abnormal; Notable for the following components:       Result Value    Glucose 202 (*)     BUN 28 (*)     Creatinine 1.40 (*)     Albumin 3.4 (*)     eGFR 53 (*)     All other components within normal limits   CBC WITH DIFFERENTIAL - Abnormal; Notable for the following components:    WBC 12.46 (*)     MPV 9.2 (*)     Neutrophils % 84.2 (*)     Lymphocytes % 7.9 (*)     Neutrophils, Abs 10.48 (*)     Lymphocytes, Absolute 0.99 (*)     All other components within normal limits    Narrative:     This is an appended report.  These results have been appended to a previously verified report.   MANUAL DIFFERENTIAL - Abnormal; Notable for the following components:    Segmented Neutrophils, Man % 87 (*)     Lymphocytes, Man % 8 (*)     Platelet Morphology Few Large Platelets (*)     All other components within normal limits   THROAT SCREEN, RAPID STREP - Normal   CBC W/ AUTO DIFFERENTIAL    Narrative:     The following orders were created for panel order CBC auto differential.  Procedure                               Abnormality         Status                     ---------                               -----------         ------                     CBC with Differential[[id number]]        Abnormal            Final result               Manual Differential[[id number]]          Abnormal            Final result                 Please view results for these tests on the individual orders.          Imaging Results    None          Medications   ampicillin-sulbactam (UNASYN) 3 g in sodium chloride 0.9 % 100 mL IVPB (MB+) (3 g Intravenous New Bag 7/25/23 0104)   methylPREDNISolone sodium  succinate injection 125 mg (125 mg Intravenous Given 7/24/23 2348)   famotidine (PF) injection 20 mg (20 mg Intravenous Given 7/24/23 2349)   diphenhydrAMINE injection 25 mg (25 mg Intravenous Given 7/24/23 2350)   diphenhydrAMINE injection 25 mg (25 mg Intravenous Given 7/25/23 0059)     Medical Decision Making:   ED Management:  Presented with wife c/o swelling to face, tongue and throat that started out as hives 3 days ago and progressed around 2130 with difficulty with speech, swallowing and breathing. States face and mouth have been swelling over the last 2 days but has gotten worse. States hives were mostly around waistband and legs. Has been taking Benadryl around to clock with some improvement with hives but continued progression swelling to mouth. Denies use of ACEI, ARB or any new medication. Wife says that he use to take ACEI but after her brother in law on her side of the family developed angioedema some years ago, they requested it be d/c'd some years ago. Denies personal family history of angioedema.     Swelling to face, tongue and uvula noted. NO erythema or exudate. Afebrile. HR 72. /85. No dyspnea or wheezing noted.   Solumedrol 125mg IVP, Benadryl 25 mg IVP, famotidine 20 mg IVP given at 2350.    0020 No change in edema to tongue or uvula.    0050 No improvement in swelling. Discussed pt's case with Dr. Benites, [facility] ED. He recommended transfer to [facility] now. Ok to sent per gound since symptoms have progressed over the last 3 days. Requested strep test and Unasyn 3 Gm IVPB prior to transfer. Benadryl 25mg IVP repeated.    Strep negative. WBC 16154    Remains alert. No increase in swelling noted. No dyspnea. O2 sat 98%. /82, HR 75, Afebrile. Transfer per [employer].           ED Course as of 07/25/23 0129 Tue Jul 25, 2023 0112 WBC(!): 12.46 [DP]   0112 Hemoglobin: 14.6 [DP]   0112 Hematocrit: 45.5 [DP]   0113 RAPID STREP A SCREEN: Negative [DP]   0127 BUN(!): 28 [DP]   0127  Creatinine(!): 1.40 [DP]   0127 Sodium: 139 [DP]   0127 Potassium: 4.2 [DP]   0127 ALT: 23 [DP]   0127 AST: 15 [DP]      ED Course User Index  [DP] Nina Samayoa NP                Clinical Impression:   Final diagnoses:  [T78.40XA] Allergic reaction, initial encounter (Primary)  [K13.79] Uvular swelling  [R22.0] Tongue swelling        ED Disposition Condition    Transfer to Another Facility Stable                Nina Samayoa NP  07/25/23 0100       Nina Samayoa NP  07/25/23 0105       Nina Samayoa NP  07/25/23 0116       Nina Samayoa NP  07/25/23 0129

## 2023-07-25 NOTE — ED TRIAGE NOTES
Presents to ER with c/o having hives around his waistline and between his legs that started about 3 days ago but seems like they are getting worse. Also thinks his throat is closing up and that also has gotten worse tonight. Voice is hoarse and he and his wife both say that is not normal. Tongue is large and he says that is hard to swallow. Unsure of what started him breaking out in hives. No new meds have been started. Wife has been given him benadryl for the last 3 days. Last dose of benadryl was at 7 pm tonight.

## 2023-07-31 NOTE — ADDENDUM NOTE
Encounter addended by: Shantal Sales on: 7/31/2023 11:50 AM   Actions taken: SmartForm saved, Flowsheet accepted

## 2023-10-10 ENCOUNTER — HOSPITAL ENCOUNTER (EMERGENCY)
Facility: HOSPITAL | Age: 73
Discharge: HOME | End: 2023-10-10
Attending: EMERGENCY MEDICINE
Payer: MEDICARE

## 2023-10-10 VITALS
SYSTOLIC BLOOD PRESSURE: 151 MMHG | HEART RATE: 71 BPM | WEIGHT: 312 LBS | OXYGEN SATURATION: 97 % | HEIGHT: 68 IN | TEMPERATURE: 98 F | DIASTOLIC BLOOD PRESSURE: 87 MMHG | BODY MASS INDEX: 47.29 KG/M2 | RESPIRATION RATE: 18 BRPM

## 2023-10-10 DIAGNOSIS — M25.539 WRIST PAIN: Primary | ICD-10-CM

## 2023-10-10 PROCEDURE — 99283 EMERGENCY DEPT VISIT LOW MDM: CPT | Mod: ER

## 2023-10-10 PROCEDURE — 29125 APPL SHORT ARM SPLINT STATIC: CPT | Mod: ER

## 2023-10-10 PROCEDURE — 29125 APPL SHORT ARM SPLINT STATIC: CPT | Mod: LT,ER

## 2023-10-10 RX ORDER — MELOXICAM 7.5 MG/1
7.5 TABLET ORAL DAILY
Qty: 10 TABLET | Refills: 0 | Status: SHIPPED | OUTPATIENT
Start: 2023-10-10 | End: 2023-10-20

## 2023-10-10 NOTE — ED PROVIDER NOTES
Encounter Date: 10/10/2023    SCRIBE #1 NOTE: IZaynab, christopher scribing for, and in the presence of,  Brenda Obrien PA-C.       History     Chief Complaint   Patient presents with    Hand Pain     A 71 y/o male presents to the ER c/o nontraumatic left hand and wrist pain x 2 weeks. Pt reports taking OTC for pain without relief. Last dosage 3 am this morning. Increased pain when attempting to grab items. +Limited ROM, +Swelling.     73 yo M with a PMHx of DM, HTN, presenting to the ED for atraumatic L wrist pain x2w. Also reports intermittent swelling, difficulty with grasping objects d/t pain. He does report worsening pain following a recent fall after the initial onset of his wrist pain. Pt is L arm dependent d/t decreased mobility of R arm 2/2 trauma at age 17yo. Works as a , only uses his L hand and arm. Hx of previous carpal tunnel with release 20y ago at his L wrist. No other exacerbating or alleviating factors. No other associated symptoms.     The history is provided by the patient. No  was used.     Review of patient's allergies indicates:   Allergen Reactions    Bactrim [sulfamethoxazole-trimethoprim] Nausea And Vomiting    Sulfa (sulfonamide antibiotics) Nausea And Vomiting    Adhesive Rash    Latex, natural rubber Rash     Past Medical History:   Diagnosis Date    Cataract     Cyst, kidney, acquired     Diabetes mellitus     Hypertension     Kidney stones      Past Surgical History:   Procedure Laterality Date    CARPAL TUNNEL RELEASE Left     ELBOW FUSION Right     KIDNEY STONE SURGERY      SHOULDER SURGERY Right     spur scraped    TOTAL KNEE ARTHROPLASTY Bilateral      Family History   Problem Relation Age of Onset    Diabetes Mother     Hypertension Mother     Cancer Father      Social History     Tobacco Use    Smoking status: Former     Current packs/day: 0.00     Average packs/day: 2.0 packs/day for 27.8 years (55.5 ttl pk-yrs)     Types: Cigarettes     Start date:  1962     Quit date: 12/3/1989     Years since quittin.8     Passive exposure: Never    Smokeless tobacco: Never    Tobacco comments:     quit 15-20 years   Substance Use Topics    Alcohol use: No    Drug use: No     Review of Systems   Constitutional:  Negative for chills, fatigue and fever.   HENT:  Negative for congestion, sinus pressure, sinus pain, sneezing and sore throat.    Eyes:  Negative for visual disturbance.   Respiratory:  Negative for cough and shortness of breath.    Cardiovascular:  Negative for chest pain.   Gastrointestinal:  Negative for abdominal pain, nausea and vomiting.   Genitourinary:  Negative for difficulty urinating.   Musculoskeletal:  Positive for arthralgias (L wrist) and joint swelling (L wrist). Negative for back pain.   Skin:  Negative for rash.   Neurological:  Negative for syncope, numbness and headaches.       Physical Exam     Initial Vitals [10/10/23 1015]   BP Pulse Resp Temp SpO2   (!) 158/84 69 18 97.8 °F (36.6 °C) 95 %      MAP       --         Physical Exam    Nursing note and vitals reviewed.  Constitutional: He appears well-developed and well-nourished. He is not diaphoretic. No distress.   HENT:   Head: Normocephalic and atraumatic.   Right Ear: External ear normal.   Left Ear: External ear normal.   Cardiovascular:  Normal rate, regular rhythm and intact distal pulses.           Pulmonary/Chest: Breath sounds normal. No respiratory distress.   Abdominal: Abdomen is soft. Bowel sounds are normal. He exhibits no distension. There is no abdominal tenderness.   Musculoskeletal:         General: No edema.      Comments: L wrist; Normal strength, sensation, ROM, capillary refill. Weak +phalen test on L. Mild generalized tenderness to L wrist.      Neurological: He is alert and oriented to person, place, and time. GCS score is 15. GCS eye subscore is 4. GCS verbal subscore is 5. GCS motor subscore is 6.   Skin: Skin is warm and dry.   Psychiatric: He has a normal  mood and affect. His behavior is normal. Judgment normal.         ED Course   Procedures  Labs Reviewed - No data to display       Imaging Results              X-Ray Wrist Complete Left (Final result)  Result time 10/10/23 12:40:05      Final result by Israel Kay MD (10/10/23 12:40:05)                   Impression:      1. No acute displaced fracture or dislocation of the wrist noting degenerative changes and edema.      Electronically signed by: Israel Kay MD  Date:    10/10/2023  Time:    12:40               Narrative:    EXAMINATION:  XR WRIST COMPLETE 3 VIEWS LEFT    CLINICAL HISTORY:  Pain in unspecified wrist    TECHNIQUE:  PA, lateral, and oblique views of the left wrist were performed.    COMPARISON:  None    FINDINGS:  Three views left wrist.    There is osteopenia.  There are degenerative changes of the 1st carpal metacarpal joint.  There is cystic degenerative change of the capitate.  No acute displaced fracture or dislocation of the wrist.  There is edema about the dorsal aspect of the hand and wrist.                                       X-Ray Hand 3 view Left (Final result)  Result time 10/10/23 12:39:24      Final result by Israel Kay MD (10/10/23 12:39:24)                   Impression:      1. Edema about the dorsal aspect of the hand, no convincing acute displaced fracture or dislocation noting osteopenia and degenerative changes.      Electronically signed by: Israel Kay MD  Date:    10/10/2023  Time:    12:39               Narrative:    EXAMINATION:  XR HAND COMPLETE 3 VIEW LEFT    CLINICAL HISTORY:  hand pain;.    TECHNIQUE:  PA, lateral, and oblique views of the left hand were performed.    COMPARISON:  None    FINDINGS:  Three views left hand.    There is osteopenia.  There are degenerative changes of the PIP and D IP joints as well as of the 1st carpal metacarpal joint.  There is edema about the dorsal aspect of the hand.                                        Medications - No data to display  Medical Decision Making  71 yo M with a PMHx of DM, HTN, presenting to the ED for atraumatic L wrist pain x2w. Also reports intermittent swelling, difficulty with grasping objects d/t pain. He does report worsening pain following a recent fall after the initial onset of his wrist pain. Pt is L arm dependent d/t decreased mobility of R arm 2/2 trauma at age 19yo. Works as a , only uses his L hand and arm. Hx of previous carpal tunnel with release 20y ago at his L wrist. No other exacerbating or alleviating factors. No other associated symptoms. On physical exam there is mild swelling noted to the dorsal aspect of the left hand.  There is normal strength, sensation and range of motion.  Generalized mild tenderness to palpation.  Normal radial pulse.  Normal capillary refill.  Positive Phalen's test (mild positive).  X-ray of the left hand and wrist shows arthritis and mild swelling, however no acute bony abnormality.  We discussed possible carpal tunnel as the cause of his pain.  I will start him on meloxicam and provide him with a wrist splint and refer him to Orthopedics for further discussion.    Of note: Ddx to include but not limited to:  Carpal tunnel, cubital tunnel, neuropathy, arthritis, cellulitis        Amount and/or Complexity of Data Reviewed  Radiology: ordered.    Risk  Prescription drug management.            Scribe Attestation:   Scribe #1: I performed the above scribed service and the documentation accurately describes the services I performed. I attest to the accuracy of the note.                      I, Brenda Obrien, personally performed the services described in this documentation. All medical record entries made by the scribe were at my direction and in my presence. I have reviewed the chart and agree that the record reflects my personal performance and is accurate and complete.    Clinical Impression:   Final diagnoses:  [M25.539] Wrist pain  (Primary)        ED Disposition Condition    Discharge Stable          ED Prescriptions       Medication Sig Dispense Start Date End Date Auth. Provider    meloxicam (MOBIC) 7.5 MG tablet Take 1 tablet (7.5 mg total) by mouth once daily. for 10 days 10 tablet 10/10/2023 10/20/2023 Brenda Obrien PA-C          Follow-up Information       Follow up With Specialties Details Why Contact Info    Khurram Conner MD Orthopedic Surgery Schedule an appointment as soon as possible for a visit in 1 week For follow up [address]  [address]  [facility] [address]  [phone]      [facility] ED Emergency Medicine Go to  As needed, If symptoms worsen [address] [address] [phone]             Brenda Obrien PA-C  10/10/23 4022

## 2023-12-25 ENCOUNTER — HOSPITAL ENCOUNTER (EMERGENCY)
Facility: HOSPITAL | Age: 73
Discharge: HOME | End: 2023-12-25
Payer: MEDICARE

## 2023-12-25 VITALS
SYSTOLIC BLOOD PRESSURE: 170 MMHG | DIASTOLIC BLOOD PRESSURE: 90 MMHG | HEART RATE: 92 BPM | WEIGHT: 300 LBS | TEMPERATURE: 98 F | RESPIRATION RATE: 18 BRPM | HEIGHT: 68 IN | BODY MASS INDEX: 45.47 KG/M2 | OXYGEN SATURATION: 95 %

## 2023-12-25 DIAGNOSIS — J10.1 INFLUENZA A: Primary | ICD-10-CM

## 2023-12-25 LAB
FLUAV AG UPPER RESP QL IA.RAPID: POSITIVE
FLUBV AG UPPER RESP QL IA.RAPID: NEGATIVE
SARS-COV-2 RDRP RESP QL NAA+PROBE: NEGATIVE

## 2023-12-25 PROCEDURE — 96372 THER/PROPH/DIAG INJ SC/IM: CPT | Performed by: NURSE PRACTITIONER

## 2023-12-25 PROCEDURE — 99284 EMERGENCY DEPT VISIT MOD MDM: CPT

## 2023-12-25 PROCEDURE — 87804 INFLUENZA ASSAY W/OPTIC: CPT | Mod: 59 | Performed by: NURSE PRACTITIONER

## 2023-12-25 PROCEDURE — 87635 SARS-COV-2 COVID-19 AMP PRB: CPT | Performed by: NURSE PRACTITIONER

## 2023-12-25 PROCEDURE — 94640 AIRWAY INHALATION TREATMENT: CPT

## 2023-12-25 PROCEDURE — 25000003 PHARM REV CODE 250: Performed by: NURSE PRACTITIONER

## 2023-12-25 PROCEDURE — 94761 N-INVAS EAR/PLS OXIMETRY MLT: CPT

## 2023-12-25 PROCEDURE — 25000242 PHARM REV CODE 250 ALT 637 W/ HCPCS: Performed by: NURSE PRACTITIONER

## 2023-12-25 PROCEDURE — 63600175 PHARM REV CODE 636 W HCPCS: Performed by: NURSE PRACTITIONER

## 2023-12-25 PROCEDURE — 99284 EMERGENCY DEPT VISIT MOD MDM: CPT | Mod: GF | Performed by: NURSE PRACTITIONER

## 2023-12-25 RX ORDER — OSELTAMIVIR PHOSPHATE 75 MG/1
75 CAPSULE ORAL 2 TIMES DAILY
Qty: 10 CAPSULE | Refills: 0 | Status: SHIPPED | OUTPATIENT
Start: 2023-12-25 | End: 2023-12-30

## 2023-12-25 RX ORDER — ALBUTEROL SULFATE 0.83 MG/ML
2.5 SOLUTION RESPIRATORY (INHALATION)
Status: COMPLETED | OUTPATIENT
Start: 2023-12-25 | End: 2023-12-25

## 2023-12-25 RX ORDER — ALBUTEROL SULFATE 90 UG/1
1-2 AEROSOL, METERED RESPIRATORY (INHALATION) EVERY 6 HOURS PRN
Qty: 18 G | Refills: 0 | Status: SHIPPED | OUTPATIENT
Start: 2023-12-25

## 2023-12-25 RX ORDER — DEXAMETHASONE SODIUM PHOSPHATE 4 MG/ML
4 INJECTION, SOLUTION INTRA-ARTICULAR; INTRALESIONAL; INTRAMUSCULAR; INTRAVENOUS; SOFT TISSUE
Status: COMPLETED | OUTPATIENT
Start: 2023-12-25 | End: 2023-12-25

## 2023-12-25 RX ORDER — AZITHROMYCIN 250 MG/1
250 TABLET, FILM COATED ORAL DAILY
Qty: 6 TABLET | Refills: 0 | Status: SHIPPED | OUTPATIENT
Start: 2023-12-25

## 2023-12-25 RX ORDER — ACETAMINOPHEN 500 MG
1000 TABLET ORAL
Status: COMPLETED | OUTPATIENT
Start: 2023-12-25 | End: 2023-12-25

## 2023-12-25 RX ADMIN — DEXAMETHASONE SODIUM PHOSPHATE 4 MG: 4 INJECTION, SOLUTION INTRA-ARTICULAR; INTRALESIONAL; INTRAMUSCULAR; INTRAVENOUS; SOFT TISSUE at 10:12

## 2023-12-25 RX ADMIN — ALBUTEROL SULFATE 2.5 MG: 2.5 SOLUTION RESPIRATORY (INHALATION) at 11:12

## 2023-12-25 RX ADMIN — ACETAMINOPHEN 1000 MG: 500 TABLET ORAL at 10:12

## 2023-12-25 RX ADMIN — ALBUTEROL SULFATE 2.5 MG: 2.5 SOLUTION RESPIRATORY (INHALATION) at 10:12

## 2023-12-26 NOTE — DISCHARGE INSTRUCTIONS
Rest at home. Take Tamiflu and azithromycin as prescribed. Take Vitamin C 1000mg daily, Mucinex (guaifenesin only) 1200 mg twice a day for congestion and cough, Flonase nasal spray to each nostril daily, and Tylenol or ibuprofen as needed for fever. Drink plenty of fluids to stay hydrated. Follow-up with your PCP if not getting better in 3-5 days or sooner for worsening. Return to the ED if trouble breathing.

## 2023-12-26 NOTE — ED PROVIDER NOTES
"Encounter Date: 2023       History     Chief Complaint   Patient presents with    Cough    Sore Throat     Presented with c/o bodyaches, prod cough of yellow sputum, sore throat, and shortness of breath that started yesterday. Reports contact with [place] people [place]. States is unaware if anyone tested positive for flu or covid but his wife reports that they all just received "shots" for treatment. PMH HTN, DM. Denies history of lung disease but reports DANIAL.       Review of patient's allergies indicates:   Allergen Reactions    Bactrim [sulfamethoxazole-trimethoprim] Nausea And Vomiting    Sulfa (sulfonamide antibiotics) Nausea And Vomiting    Adhesive Rash    Latex, natural rubber Rash     Past Medical History:   Diagnosis Date    Cataract     Cyst, kidney, acquired     Diabetes mellitus     Hypertension     Kidney stones      Past Surgical History:   Procedure Laterality Date    CARPAL TUNNEL RELEASE Left     ELBOW FUSION Right     KIDNEY STONE SURGERY      SHOULDER SURGERY Right     spur scraped    TOTAL KNEE ARTHROPLASTY Bilateral      Family History   Problem Relation Age of Onset    Diabetes Mother     Hypertension Mother     Cancer Father      Social History     Tobacco Use    Smoking status: Former     Current packs/day: 0.00     Average packs/day: 2.0 packs/day for 27.8 years (55.5 ttl pk-yrs)     Types: Cigarettes     Start date: 1962     Quit date: 12/3/1989     Years since quittin.0     Passive exposure: Never    Smokeless tobacco: Never    Tobacco comments:     quit 15-20 years   Substance Use Topics    Alcohol use: No    Drug use: No     Review of Systems   Constitutional:  Positive for activity change, appetite change, fatigue and fever.   HENT:  Positive for congestion and sore throat.    Respiratory:  Positive for cough, shortness of breath and wheezing.    Cardiovascular:  Negative for chest pain.   Gastrointestinal:  Negative for abdominal pain, " diarrhea, nausea and vomiting.   Genitourinary:  Negative for difficulty urinating.   Musculoskeletal:  Positive for myalgias.   Skin:  Negative for rash.   Neurological:  Positive for headaches.   Psychiatric/Behavioral: Negative.         Physical Exam     Initial Vitals [12/25/23 2207]   BP Pulse Resp Temp SpO2   (!) 184/91 83 20 98.1 °F (36.7 °C) 95 %      MAP       --         Physical Exam    Nursing note and vitals reviewed.  Constitutional: He appears well-developed and well-nourished. No distress.   HENT:   Head: Normocephalic.   Right Ear: External ear normal.   Left Ear: External ear normal.   Nose: Nose normal.   Mouth/Throat: Oropharynx is clear and moist.   Eyes: EOM are normal. Pupils are equal, round, and reactive to light.   Neck: Neck supple.   Normal range of motion.  Cardiovascular:  Normal rate, regular rhythm, normal heart sounds and intact distal pulses.           No murmur heard.  Pulmonary/Chest: No respiratory distress. He has wheezes. He has no rhonchi. He has no rales.   Abdominal: Abdomen is soft. Bowel sounds are normal. There is no abdominal tenderness.   Musculoskeletal:         General: Normal range of motion.      Cervical back: Normal range of motion and neck supple.     Neurological: He is alert and oriented to person, place, and time. He has normal strength. GCS score is 15. GCS eye subscore is 4. GCS verbal subscore is 5. GCS motor subscore is 6.   Skin: Skin is warm and dry. Capillary refill takes less than 2 seconds.   Psychiatric: He has a normal mood and affect.         Medical Screening Exam   See Full Note    ED Course   Procedures  Labs Reviewed   RAPID INFLUENZA A/B - Abnormal; Notable for the following components:       Result Value    Influenza A Positive (*)     All other components within normal limits   SARS-COV-2 RNA AMPLIFICATION, QUAL - Normal    Narrative:     Negative SARS-CoV results should not be used as the sole basis for treatment or patient management  "decisions; negative results should be considered in the context of a patient's recent exposures, history and the presene of clinical signs and symptoms consistent with COVID-19.  Negative results should be treated as presumptive and confirmed by molecular assay, if necessary for patient management.          Imaging Results    None          Medications   albuterol nebulizer solution 2.5 mg (2.5 mg Nebulization Given by Other 12/25/23 2252)   dexAMETHasone injection 4 mg (4 mg Intramuscular Given 12/25/23 2247)   acetaminophen tablet 1,000 mg (1,000 mg Oral Given 12/25/23 2246)   albuterol nebulizer solution 2.5 mg (2.5 mg Nebulization Given by Other 12/25/23 2308)     Medical Decision Making  Presented with c/o bodyaches, prod cough of yellow sputum, sore throat, and shortness of breath that started yesterday. Reports contact with several people with similar symptoms [place]. States is unaware if anyone tested positive for flu or covid but his wife reports that they all just received "shots" for treatment.    Amount and/or Complexity of Data Reviewed  Labs: ordered.     Details: Flu a positive, Flu b and COVID 19 negative.    Risk  Risk Details: Tylenol 1000mg po given. Albuterol nebs x 2. Wheezing improving. O2 sat 95-98% on RA. Continues to deny chest pain. Rx for Tamiflu, azithromycin, albuterol MDI. Pt is stable. Instructed on home care, med use, follow-up and return precautions. Discharged home with detailed instructions provided.               ED Course as of 12/26/23 0031   Mon Dec 25, 2023   2244 Influenza A(!): Positive [DP]   2244 Influenza B, Molecular: Negative [DP]   2244 ID NOW COVID-19, (ROCIO): Negative [DP]   2306 Lungs noted with wheezing after 1st albuterol neb treatment. Repeat neb. [DP]   2325 Improved breathing, wheezing and air movement after 2nd neb. [DP]      ED Course User Index  [DP] Nina Samayoa NP                           Clinical Impression:   Final diagnoses:  [J10.1] " Influenza A (Primary)        ED Disposition Condition    Discharge Stable          ED Prescriptions       Medication Sig Dispense Start Date End Date Auth. Provider    oseltamivir (TAMIFLU) 75 MG capsule Take 1 capsule (75 mg total) by mouth 2 (two) times daily. for 5 days 10 capsule 12/25/2023 12/30/2023 Nina Samayoa NP    azithromycin (Z-JAMISON) 250 MG tablet Take 1 tablet (250 mg total) by mouth once daily. Take first 2 tablets together, then 1 every day until finished. 6 tablet 12/25/2023 -- Nina Samayoa NP    albuterol (PROVENTIL/VENTOLIN HFA) 90 mcg/actuation inhaler Inhale 1-2 puffs into the lungs every 6 (six) hours as needed for Wheezing or Shortness of Breath. Rescue 18 g 12/25/2023 -- Nina Samayoa NP          Follow-up Information       Follow up With Specialties Details Why Contact Info    [facility] - Emergency Department Emergency Medicine  If symptoms worsen [address] [address] [phone]             Nina Samayoa NP  12/26/23 0031

## 2024-01-03 NOTE — ADDENDUM NOTE
Encounter addended by: Altaf Vyas, RRT on: 1/3/2024 8:32 AM   Actions taken: Charge Capture section accepted

## 2024-12-19 ENCOUNTER — TELEPHONE (OUTPATIENT)
Dept: HEMATOLOGY/ONCOLOGY | Facility: CLINIC | Age: 74
End: 2024-12-19
Payer: MEDICARE